# Patient Record
Sex: FEMALE | Race: WHITE | Employment: FULL TIME | URBAN - METROPOLITAN AREA
[De-identification: names, ages, dates, MRNs, and addresses within clinical notes are randomized per-mention and may not be internally consistent; named-entity substitution may affect disease eponyms.]

---

## 2017-06-26 ENCOUNTER — TELEPHONE (OUTPATIENT)
Dept: INTERNAL MEDICINE | Age: 27
End: 2017-06-26

## 2017-06-30 ENCOUNTER — OFFICE VISIT (OUTPATIENT)
Dept: INTERNAL MEDICINE | Age: 27
End: 2017-06-30
Attending: PODIATRIST

## 2017-06-30 DIAGNOSIS — M76.61 ACHILLES TENDINITIS OF BOTH LOWER EXTREMITIES: ICD-10-CM

## 2017-06-30 DIAGNOSIS — M76.62 ACHILLES TENDINITIS OF BOTH LOWER EXTREMITIES: ICD-10-CM

## 2018-07-19 ENCOUNTER — HOSPITAL ENCOUNTER (EMERGENCY)
Age: 28
Discharge: HOME | End: 2018-07-19
Attending: EMERGENCY MEDICINE
Payer: COMMERCIAL

## 2018-07-19 ENCOUNTER — APPOINTMENT (OUTPATIENT)
Dept: GENERAL RADIOLOGY | Age: 28
End: 2018-07-19
Payer: COMMERCIAL

## 2018-07-19 VITALS
TEMPERATURE: 98.1 F | HEART RATE: 81 BPM | RESPIRATION RATE: 18 BRPM | DIASTOLIC BLOOD PRESSURE: 64 MMHG | SYSTOLIC BLOOD PRESSURE: 100 MMHG | OXYGEN SATURATION: 99 %

## 2018-07-19 DIAGNOSIS — M79.672 LEFT FOOT PAIN: Primary | ICD-10-CM

## 2018-07-19 PROCEDURE — 73630 X-RAY EXAM OF FOOT: CPT

## 2018-07-19 PROCEDURE — 99283 EMERGENCY DEPT VISIT LOW MDM: CPT

## 2018-07-19 PROCEDURE — 6370000000 HC RX 637 (ALT 250 FOR IP): Performed by: EMERGENCY MEDICINE

## 2018-07-19 RX ORDER — NAPROXEN 500 MG/1
500 TABLET ORAL 2 TIMES DAILY WITH MEALS
Qty: 30 TABLET | Refills: 0 | Status: SHIPPED | OUTPATIENT
Start: 2018-07-19 | End: 2018-10-26 | Stop reason: ALTCHOICE

## 2018-07-19 RX ORDER — IBUPROFEN 400 MG/1
800 TABLET ORAL ONCE
Status: COMPLETED | OUTPATIENT
Start: 2018-07-19 | End: 2018-07-19

## 2018-07-19 RX ORDER — ACETAMINOPHEN 500 MG
1000 TABLET ORAL ONCE
Status: COMPLETED | OUTPATIENT
Start: 2018-07-19 | End: 2018-07-19

## 2018-07-19 RX ADMIN — IBUPROFEN 800 MG: 400 TABLET ORAL at 08:18

## 2018-07-19 RX ADMIN — ACETAMINOPHEN 1000 MG: 500 TABLET ORAL at 08:19

## 2018-07-19 ASSESSMENT — PAIN DESCRIPTION - PAIN TYPE: TYPE: ACUTE PAIN;CHRONIC PAIN

## 2018-07-19 ASSESSMENT — PAIN DESCRIPTION - LOCATION: LOCATION: FOOT

## 2018-07-19 ASSESSMENT — PAIN DESCRIPTION - ORIENTATION: ORIENTATION: LEFT

## 2018-07-19 ASSESSMENT — PAIN SCALES - GENERAL
PAINLEVEL_OUTOF10: 8
PAINLEVEL_OUTOF10: 8

## 2018-07-19 ASSESSMENT — PAIN DESCRIPTION - ONSET: ONSET: ON-GOING

## 2018-07-19 ASSESSMENT — PAIN DESCRIPTION - FREQUENCY: FREQUENCY: INTERMITTENT

## 2018-07-19 ASSESSMENT — PAIN DESCRIPTION - DESCRIPTORS: DESCRIPTORS: ACHING;SHARP

## 2018-07-19 ASSESSMENT — PAIN DESCRIPTION - PROGRESSION: CLINICAL_PROGRESSION: NOT CHANGED

## 2018-07-25 ENCOUNTER — OFFICE VISIT (OUTPATIENT)
Dept: ORTHOPEDIC SURGERY | Age: 28
End: 2018-07-25

## 2018-07-25 VITALS
BODY MASS INDEX: 34.55 KG/M2 | SYSTOLIC BLOOD PRESSURE: 121 MMHG | HEART RATE: 87 BPM | WEIGHT: 215 LBS | DIASTOLIC BLOOD PRESSURE: 77 MMHG | HEIGHT: 66 IN

## 2018-07-25 DIAGNOSIS — M72.2 PLANTAR FASCIITIS, BILATERAL: Primary | ICD-10-CM

## 2018-07-25 PROBLEM — Z79.899 CONTROLLED SUBSTANCE AGREEMENT SIGNED: Status: ACTIVE | Noted: 2017-11-27

## 2018-07-25 PROCEDURE — L3040 FT ARCH SUPRT PREMOLD LONGIT: HCPCS | Performed by: PODIATRIST

## 2018-07-25 PROCEDURE — G8427 DOCREV CUR MEDS BY ELIG CLIN: HCPCS | Performed by: PODIATRIST

## 2018-07-25 PROCEDURE — G8417 CALC BMI ABV UP PARAM F/U: HCPCS | Performed by: PODIATRIST

## 2018-07-25 PROCEDURE — L4360 PNEUMAT WALKING BOOT PRE CST: HCPCS | Performed by: PODIATRIST

## 2018-07-25 PROCEDURE — 1036F TOBACCO NON-USER: CPT | Performed by: PODIATRIST

## 2018-07-25 PROCEDURE — 99203 OFFICE O/P NEW LOW 30 MIN: CPT | Performed by: PODIATRIST

## 2018-07-25 RX ORDER — THERMOMETER, ELECTRONIC,ORAL
EACH MISCELLANEOUS
COMMUNITY
Start: 2018-05-08 | End: 2018-10-26 | Stop reason: ALTCHOICE

## 2018-07-25 RX ORDER — PREDNISONE 10 MG/1
TABLET ORAL
Qty: 26 TABLET | Refills: 0 | Status: SHIPPED | OUTPATIENT
Start: 2018-07-25 | End: 2018-10-26 | Stop reason: ALTCHOICE

## 2018-07-25 RX ORDER — FUROSEMIDE 20 MG/1
20 TABLET ORAL
COMMUNITY

## 2018-07-25 RX ORDER — DEXTROAMPHETAMINE SACCHARATE, AMPHETAMINE ASPARTATE, DEXTROAMPHETAMINE SULFATE AND AMPHETAMINE SULFATE 2.5; 2.5; 2.5; 2.5 MG/1; MG/1; MG/1; MG/1
TABLET ORAL
Refills: 0 | COMMUNITY
Start: 2018-07-19 | End: 2018-10-26 | Stop reason: ALTCHOICE

## 2018-07-25 RX ORDER — PREDNISONE 20 MG/1
40 TABLET ORAL
COMMUNITY
Start: 2018-05-08 | End: 2018-10-26 | Stop reason: ALTCHOICE

## 2018-07-25 NOTE — PROGRESS NOTES
HISTORY OF PRESENT ILLNESS: This is an initial visit for a 66-year-old female with heel pain to both feet. She's been having pain on and off for about 7 years. There is more pain to the left foot. There is no history of trauma. The pain is worsened with activity and relieved by rest.  Pain is often present in the mornings and after short periods of inactivity. Initially, after movement, the pain is sharp but seems to resolve for a short time with activity, only to return with prolonged activity. The patient denies any shooting pain to the toes or on the bottom of the foot. No treatment has been tried other than custom and over-the-counter foot orthoses and occasional anti-inflammatories. FAMILY HISTORY: Documented in chart. SOCIAL HISTORY: Documented in chart. REVIEW OF SYSTEMS: The patient denies any problems with cardiovascular, pulmonary, gastrointestinal, neurologic, urologic, genitourinary, psychiatric, dermatologic, and HEENT systems. PHYSICAL EXAMINATION:  The patient is alert and orientated x3. The area of greatest palpable tenderness is at the plantar medial and plantar central aspect of the left arch. She has mild pain at the plantar heel. There is no pain with side-to-side compression of the heel. There are no paresthesias elicited at the tibial nerve, over the tarsal tunnel. There is less pain to the same area of the other heel. Again, no pain is elicited with side-to-side compression of the heel. No paresthesias are noted with percussion over the heel or the tarsal tunnel. Sensation is otherwise grossly intact. There is no erythema, ecchymosis, or edema noted. Palpable pedal pulses are present. The remainder of the exam is unremarkable. RADIOGRAPHS: 3 nonweightbearing x-ray views of the left foot were reviewed. X-rays demonstrate no signs of stress fracture in the heel. No acute fractures or periosteal reactions are noted.       ASSESSMENT: Plantar fasciitis,

## 2018-07-26 ENCOUNTER — TELEPHONE (OUTPATIENT)
Dept: ORTHOPEDIC SURGERY | Age: 28
End: 2018-07-26

## 2018-08-16 ENCOUNTER — OFFICE VISIT (OUTPATIENT)
Dept: ORTHOPEDIC SURGERY | Age: 28
End: 2018-08-16

## 2018-08-16 VITALS — BODY MASS INDEX: 33.75 KG/M2 | WEIGHT: 210 LBS | HEIGHT: 66 IN

## 2018-08-16 DIAGNOSIS — M72.2 PLANTAR FASCIITIS, BILATERAL: Primary | ICD-10-CM

## 2018-08-16 DIAGNOSIS — B37.9 YEAST INFECTION: ICD-10-CM

## 2018-08-16 PROCEDURE — G8427 DOCREV CUR MEDS BY ELIG CLIN: HCPCS | Performed by: PODIATRIST

## 2018-08-16 PROCEDURE — 99213 OFFICE O/P EST LOW 20 MIN: CPT | Performed by: PODIATRIST

## 2018-08-16 PROCEDURE — G8417 CALC BMI ABV UP PARAM F/U: HCPCS | Performed by: PODIATRIST

## 2018-08-16 PROCEDURE — 1036F TOBACCO NON-USER: CPT | Performed by: PODIATRIST

## 2018-08-16 RX ORDER — FLUCONAZOLE 100 MG/1
150 TABLET ORAL DAILY
Qty: 1 TABLET | Refills: 0 | Status: SHIPPED | OUTPATIENT
Start: 2018-08-16 | End: 2018-08-17

## 2018-08-23 ENCOUNTER — HOSPITAL ENCOUNTER (EMERGENCY)
Age: 28
Discharge: HOME | End: 2018-08-23
Attending: EMERGENCY MEDICINE
Payer: COMMERCIAL

## 2018-08-23 VITALS
RESPIRATION RATE: 18 BRPM | TEMPERATURE: 98.3 F | SYSTOLIC BLOOD PRESSURE: 143 MMHG | DIASTOLIC BLOOD PRESSURE: 68 MMHG | HEART RATE: 90 BPM | OXYGEN SATURATION: 100 %

## 2018-08-23 DIAGNOSIS — M79.605 PAIN OF LEFT LOWER EXTREMITY: Primary | ICD-10-CM

## 2018-08-23 PROCEDURE — 99282 EMERGENCY DEPT VISIT SF MDM: CPT

## 2018-08-23 ASSESSMENT — PAIN SCALES - GENERAL: PAINLEVEL_OUTOF10: 8

## 2018-08-23 ASSESSMENT — ENCOUNTER SYMPTOMS
VOICE CHANGE: 0
DIARRHEA: 0
CONSTIPATION: 0
BACK PAIN: 0
SHORTNESS OF BREATH: 0
NAUSEA: 0
VOMITING: 0
RHINORRHEA: 0
WHEEZING: 0
COUGH: 0
ABDOMINAL PAIN: 0
PHOTOPHOBIA: 0
COLOR CHANGE: 0
TROUBLE SWALLOWING: 0

## 2018-08-23 ASSESSMENT — PAIN DESCRIPTION - PAIN TYPE: TYPE: ACUTE PAIN

## 2018-08-23 ASSESSMENT — PAIN DESCRIPTION - ORIENTATION: ORIENTATION: LEFT

## 2018-08-23 ASSESSMENT — PAIN DESCRIPTION - LOCATION: LOCATION: FOOT

## 2018-08-23 NOTE — ED PROVIDER NOTES
[address] RESIDENT NOTE       Date of evaluation: 2018    Chief Complaint     Foot Pain      History of Present Illness     Veronica Palmer is a 25 y.o. female who presents To the emergency department with left foot pain. The patient states she was diagnosed with plantar fasciitis Approximately 5 weeks ago by her podiatrist was placed in a boot. The patient states she has worn the boot as she was supposed to with improvement of plantar fasciitis symptoms, however now she has pain in the medial aspect of the left foot and an abrasion. The patient states the pain is sharp, consistent, made worse with palpation or movement, associated with swelling and does not radiate. The patient states the pain has been present for multiple weeks. Review of Systems     Review of Systems   Constitutional: Negative for activity change and appetite change. HENT: Negative for postnasal drip, rhinorrhea, trouble swallowing and voice change. Eyes: Negative for photophobia and visual disturbance. Respiratory: Negative for cough, shortness of breath and wheezing. Cardiovascular: Negative for chest pain and palpitations. Gastrointestinal: Negative for abdominal pain, constipation, diarrhea, nausea and vomiting. Genitourinary: Negative for difficulty urinating, dysuria, frequency and urgency. Musculoskeletal: Positive for arthralgias and joint swelling. Negative for back pain and gait problem. Skin: Negative for color change and wound. Neurological: Negative for syncope, weakness, light-headedness and headaches. Past Medical, Surgical, Family, and Social History     She has a past medical history of ADHD (attention deficit hyperactivity disorder); Back pain; and Plantar fasciitis of left foot. She has a past surgical history that includes  section; laparoscopy (N/A, 14); and Cholecystectomy.   Her family history includes No Known Problems in her brother, father, maternal aunt, maternal grandfather, maternal grandmother, maternal uncle, mother, paternal aunt, paternal grandfather, paternal grandmother, paternal uncle, sister, and another family member. She reports that she quit smoking about 3 years ago. She smoked 1.00 pack per day. She has never used smokeless tobacco. She reports that she drinks alcohol. She reports that she does not use drugs. Medications     Previous Medications    AMPHETAMINE-DEXTROAMPHETAMINE (ADDERALL XR) 10 MG EXTENDED RELEASE CAPSULE    Take 10 mg by mouth 3 times daily . AMPHETAMINE-DEXTROAMPHETAMINE (ADDERALL) 10 MG TABLET    TK 1 T PO TID    FERROUS SULFATE 325 (65 FE) MG TABLET    Take 325 mg by mouth daily (with breakfast)    FUROSEMIDE (LASIX) 20 MG TABLET    Take 20 mg by mouth    MULTIPLE VITAMINS-MINERALS (THERAPEUTIC MULTIVITAMIN-MINERALS) TABLET    Take 1 tablet by mouth daily    NAPROXEN (NAPROSYN) 500 MG TABLET    Take 1 tablet by mouth 2 times daily (with meals) for 30 doses    PREDNISONE (DELTASONE) 10 MG TABLET    3 po bid x 2 days, then 2 po bid x 2 days, then 1 po bid x 2 days, then 1 po qd x 2 days    PREDNISONE (DELTASONE) 20 MG TABLET    Take 40 mg by mouth    TOLNAFTATE (TINACTIN) 1 % CREAM    Apply topically       Allergies     She is allergic to pcn [penicillins]. Physical Exam     INITIAL VITALS: BP: (!) 143/68, Temp: 98.3 °F (36.8 °C), Pulse: 90, Resp: 18, SpO2: 100 %   Physical Exam   Constitutional: She is oriented to person, place, and time. She appears well-developed and well-nourished. Eyes: Pupils are equal, round, and reactive to light. Conjunctivae are normal.   Neck: No tracheal deviation present. Cardiovascular: Normal rate and regular rhythm. Pulmonary/Chest: Effort normal. No respiratory distress. She has no wheezes. She has no rales. Abdominal: She exhibits no distension. There is no tenderness. There is no rebound. Musculoskeletal: Normal range of motion.  She exhibits tenderness. She exhibits no edema or deformity. Neurological: She is alert and oriented to person, place, and time. No cranial nerve deficit. Coordination normal.   Skin: Skin is warm and dry. Vitals reviewed. Diagnostic Results     RADIOLOGY:  No orders to display       LABS:   No results found for this visit on 08/23/18. RECENT VITALS:  BP: (!) 143/68, Temp: 98.3 °F (36.8 °C), Pulse: 90, Resp: 18, SpO2: 100 %     Procedures     ED Course     Nursing Notes, Past Medical Hx, Past Surgical Hx, Social Hx, Allergies, and Family Hx were reviewed. The patient was given the following medications:  No orders of the defined types were placed in this encounter. CONSULTS:  None    MEDICAL DECISION MAKING / ASSESSMENT / PLAN     Fabi Bob is a 25 y.o. female     Patient presents emergency department for left foot pain. The patient previously been thing is a plantar fasciitis, seen a podiatrist and is currently being treated for plantar fasciitis. Patient states this pain is more medial on the foot and is associated with an abrasion. Suspect the patient is likely secondary to the patient's for which he has been wearing. The patient has no signs of infection on the abrasion including erythema, swelling, drainage, induration or warmth. Patient is no evidence of septic arthritis including swelling, warmth, pain with passive motion. Patient is [place] ankle negative, I do not believe x-ray of ankle was indicated at this time. However, if the patient were to have a fracture she is currently being treated with a boot which is superficial for fracture. Patient has no leg swelling, or pain in her lower extremity other than foot, I have little concern for DVT. Patient was given strict return precautions and advised to follow-up with her podiatrist.     This patient was also evaluated by the attending physician. All care plans were discussed and agreed upon. Clinical Impression     1.  Pain of left lower extremity

## 2018-10-26 ENCOUNTER — HOSPITAL ENCOUNTER (EMERGENCY)
Age: 28
Discharge: HOME | End: 2018-10-26
Attending: EMERGENCY MEDICINE
Payer: COMMERCIAL

## 2018-10-26 ENCOUNTER — APPOINTMENT (OUTPATIENT)
Dept: GENERAL RADIOLOGY | Age: 28
End: 2018-10-26
Payer: COMMERCIAL

## 2018-10-26 ENCOUNTER — APPOINTMENT (OUTPATIENT)
Dept: CT IMAGING | Age: 28
End: 2018-10-26
Payer: COMMERCIAL

## 2018-10-26 VITALS
DIASTOLIC BLOOD PRESSURE: 70 MMHG | BODY MASS INDEX: 34.53 KG/M2 | RESPIRATION RATE: 30 BRPM | HEART RATE: 89 BPM | HEIGHT: 67 IN | WEIGHT: 220 LBS | TEMPERATURE: 97.8 F | SYSTOLIC BLOOD PRESSURE: 123 MMHG | OXYGEN SATURATION: 97 %

## 2018-10-26 DIAGNOSIS — M94.0 COSTOCHONDRITIS: Primary | ICD-10-CM

## 2018-10-26 LAB
ANION GAP SERPL CALCULATED.3IONS-SCNC: 15 MMOL/L (ref 3–16)
BASOPHILS ABSOLUTE: 0 K/UL (ref 0–0.2)
BASOPHILS RELATIVE PERCENT: 0.3 %
BUN BLDV-MCNC: 9 MG/DL (ref 7–20)
CALCIUM SERPL-MCNC: 9.5 MG/DL (ref 8.3–10.6)
CHLORIDE BLD-SCNC: 102 MMOL/L (ref 99–110)
CO2: 21 MMOL/L (ref 21–32)
CREAT SERPL-MCNC: 0.6 MG/DL (ref 0.6–1.1)
EOSINOPHILS ABSOLUTE: 0.1 K/UL (ref 0–0.6)
EOSINOPHILS RELATIVE PERCENT: 1.3 %
GFR AFRICAN AMERICAN: >60
GFR NON-AFRICAN AMERICAN: >60
GLUCOSE BLD-MCNC: 154 MG/DL (ref 70–99)
HCT VFR BLD CALC: 40.5 % (ref 36–48)
HEMOGLOBIN: 13.3 G/DL (ref 12–16)
LYMPHOCYTES ABSOLUTE: 3.3 K/UL (ref 1–5.1)
LYMPHOCYTES RELATIVE PERCENT: 29.6 %
MCH RBC QN AUTO: 29 PG (ref 26–34)
MCHC RBC AUTO-ENTMCNC: 32.9 G/DL (ref 31–36)
MCV RBC AUTO: 88.1 FL (ref 80–100)
MONOCYTES ABSOLUTE: 0.4 K/UL (ref 0–1.3)
MONOCYTES RELATIVE PERCENT: 3.8 %
NEUTROPHILS ABSOLUTE: 7.2 K/UL (ref 1.7–7.7)
NEUTROPHILS RELATIVE PERCENT: 65 %
PDW BLD-RTO: 12.4 % (ref 12.4–15.4)
PLATELET # BLD: 305 K/UL (ref 135–450)
PMV BLD AUTO: 8.3 FL (ref 5–10.5)
POTASSIUM SERPL-SCNC: 3.6 MMOL/L (ref 3.5–5.1)
PREGNANCY, URINE: NEGATIVE
RBC # BLD: 4.6 M/UL (ref 4–5.2)
SODIUM BLD-SCNC: 138 MMOL/L (ref 136–145)
TROPONIN: <0.01 NG/ML
WBC # BLD: 11 K/UL (ref 4–11)

## 2018-10-26 PROCEDURE — 80048 BASIC METABOLIC PNL TOTAL CA: CPT

## 2018-10-26 PROCEDURE — 85025 COMPLETE CBC W/AUTO DIFF WBC: CPT

## 2018-10-26 PROCEDURE — 6360000002 HC RX W HCPCS: Performed by: NURSE PRACTITIONER

## 2018-10-26 PROCEDURE — 96374 THER/PROPH/DIAG INJ IV PUSH: CPT

## 2018-10-26 PROCEDURE — 6360000004 HC RX CONTRAST MEDICATION: Performed by: NURSE PRACTITIONER

## 2018-10-26 PROCEDURE — 93005 ELECTROCARDIOGRAM TRACING: CPT | Performed by: NURSE PRACTITIONER

## 2018-10-26 PROCEDURE — 99284 EMERGENCY DEPT VISIT MOD MDM: CPT

## 2018-10-26 PROCEDURE — 71046 X-RAY EXAM CHEST 2 VIEWS: CPT

## 2018-10-26 PROCEDURE — 84484 ASSAY OF TROPONIN QUANT: CPT

## 2018-10-26 PROCEDURE — 84703 CHORIONIC GONADOTROPIN ASSAY: CPT

## 2018-10-26 PROCEDURE — 71260 CT THORAX DX C+: CPT

## 2018-10-26 RX ORDER — DEXTROAMPHETAMINE SACCHARATE, AMPHETAMINE ASPARTATE, DEXTROAMPHETAMINE SULFATE AND AMPHETAMINE SULFATE 7.5; 7.5; 7.5; 7.5 MG/1; MG/1; MG/1; MG/1
30 TABLET ORAL DAILY
COMMUNITY
Start: 2018-10-01 | End: 2018-10-31

## 2018-10-26 RX ORDER — KETOROLAC TROMETHAMINE 30 MG/ML
15 INJECTION, SOLUTION INTRAMUSCULAR; INTRAVENOUS ONCE
Status: COMPLETED | OUTPATIENT
Start: 2018-10-26 | End: 2018-10-26

## 2018-10-26 RX ORDER — DEXTROAMPHETAMINE SACCHARATE, AMPHETAMINE ASPARTATE, DEXTROAMPHETAMINE SULFATE AND AMPHETAMINE SULFATE 2.5; 2.5; 2.5; 2.5 MG/1; MG/1; MG/1; MG/1
10 TABLET ORAL DAILY
COMMUNITY
Start: 2018-10-01 | End: 2018-10-31

## 2018-10-26 RX ORDER — IBUPROFEN 600 MG/1
600 TABLET ORAL EVERY 6 HOURS PRN
Qty: 30 TABLET | Refills: 0 | Status: SHIPPED | OUTPATIENT
Start: 2018-10-26 | End: 2018-12-19

## 2018-10-26 RX ADMIN — KETOROLAC TROMETHAMINE 15 MG: 30 INJECTION, SOLUTION INTRAMUSCULAR at 20:17

## 2018-10-26 RX ADMIN — IOPAMIDOL 80 ML: 755 INJECTION, SOLUTION INTRAVENOUS at 21:14

## 2018-10-26 ASSESSMENT — PAIN DESCRIPTION - FREQUENCY
FREQUENCY: CONTINUOUS
FREQUENCY: CONTINUOUS

## 2018-10-26 ASSESSMENT — PAIN DESCRIPTION - ORIENTATION
ORIENTATION: ANTERIOR;POSTERIOR;MID;UPPER
ORIENTATION: ANTERIOR;POSTERIOR;MID;UPPER

## 2018-10-26 ASSESSMENT — PAIN DESCRIPTION - DESCRIPTORS
DESCRIPTORS: DISCOMFORT;STABBING
DESCRIPTORS: DISCOMFORT;STABBING

## 2018-10-26 ASSESSMENT — PAIN DESCRIPTION - ONSET
ONSET: ON-GOING
ONSET: ON-GOING

## 2018-10-26 ASSESSMENT — PAIN DESCRIPTION - LOCATION
LOCATION: CHEST;BACK
LOCATION: CHEST;BACK

## 2018-10-26 ASSESSMENT — PAIN SCALES - GENERAL
PAINLEVEL_OUTOF10: 8
PAINLEVEL_OUTOF10: 6

## 2018-10-26 ASSESSMENT — PAIN DESCRIPTION - PAIN TYPE
TYPE: ACUTE PAIN
TYPE: ACUTE PAIN

## 2018-10-26 ASSESSMENT — PAIN DESCRIPTION - PROGRESSION: CLINICAL_PROGRESSION: GRADUALLY IMPROVING

## 2018-10-26 NOTE — ED PROVIDER NOTES
tablet by mouth every 6 hours as needed for Pain     Dispense:  30 tablet     Refill:  0            CONSULTS:  None    MEDICAL DECISION MAKING / ASSESSMENT / Carolina Flores is a 25 y.o. female presents emergency room with complaints of left scapular pain that started early this morning upon wakening that radiates into the left breast along the sternal region. Patient denies obvious injury. 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 Pain does increase with palpation, movement, and deep inspiration. Patient has been dyspneic all day. States difficult to get deep breath. She had not taken any medications for her symptoms. She was seen in urgent care and instructed to come to the ED for evaluation or rule out PE. Patient does smoke. Denies birth control pills. Denies recent trips. Denies calf tenderness but has noticed some right lower leg swelling that started early this morning. Patient states she is adopted and does not know her family history. EKG revealed normal sinus rhythm rate 88 bpm.  No acute findings. Chest x-ray negative per radiology. CBC, BMP, troponin negative. Chest CT negative for PE. Patient states feeling better after Toradol injection. Symptoms appear consistent with costochondritis. Written a prescription for ibuprofen. Instructed to push fluids. Call her primary doctor on Monday for follow-up appointment. Return here for worsening of symptoms or other concerns. This patient was alsoevaluated by the attending physician. Complete historyand physical was performed by me and Deepthi Mccurdy MD  . Nursing notes, past medical history, surgical history, family history and social history were reviewed and addressed in the HPI. All care plans were discussed and agreed upon. Clinical Impression     1.  Costochondritis        Disposition     PATIENT REFERRED TO:  Jesi Dumont MD  [facility]  [address] [address]  [phone]      Call Monday for an appointment      DISCHARGE MEDICATIONS:  New Prescriptions    IBUPROFEN (ADVIL;MOTRIN) 600 MG TABLET    Take 1 tablet by mouth every 6 hours as needed for Pain       DISPOSITION Decision To Discharge        LUKAS Voss - ANA  10/26/18 3808

## 2018-10-29 LAB
EKG ATRIAL RATE: 88 BPM
EKG DIAGNOSIS: NORMAL
EKG P AXIS: 50 DEGREES
EKG P-R INTERVAL: 120 MS
EKG Q-T INTERVAL: 388 MS
EKG QRS DURATION: 74 MS
EKG QTC CALCULATION (BAZETT): 469 MS
EKG R AXIS: 46 DEGREES
EKG T AXIS: 44 DEGREES
EKG VENTRICULAR RATE: 88 BPM

## 2018-12-19 ENCOUNTER — APPOINTMENT (OUTPATIENT)
Dept: GENERAL RADIOLOGY | Age: 28
End: 2018-12-19
Payer: COMMERCIAL

## 2018-12-19 ENCOUNTER — HOSPITAL ENCOUNTER (EMERGENCY)
Age: 28
Discharge: HOME | End: 2018-12-19
Attending: EMERGENCY MEDICINE
Payer: COMMERCIAL

## 2018-12-19 VITALS
DIASTOLIC BLOOD PRESSURE: 84 MMHG | BODY MASS INDEX: 33.74 KG/M2 | HEIGHT: 67 IN | OXYGEN SATURATION: 99 % | WEIGHT: 215 LBS | TEMPERATURE: 98.4 F | RESPIRATION RATE: 20 BRPM | SYSTOLIC BLOOD PRESSURE: 137 MMHG | HEART RATE: 97 BPM

## 2018-12-19 DIAGNOSIS — M79.671 RIGHT FOOT PAIN: Primary | ICD-10-CM

## 2018-12-19 PROCEDURE — 6370000000 HC RX 637 (ALT 250 FOR IP): Performed by: EMERGENCY MEDICINE

## 2018-12-19 PROCEDURE — 73630 X-RAY EXAM OF FOOT: CPT

## 2018-12-19 PROCEDURE — 99283 EMERGENCY DEPT VISIT LOW MDM: CPT

## 2018-12-19 RX ORDER — IBUPROFEN 600 MG/1
600 TABLET ORAL EVERY 6 HOURS PRN
Qty: 30 TABLET | Refills: 0 | Status: SHIPPED | OUTPATIENT
Start: 2018-12-19

## 2018-12-19 RX ORDER — DEXTROAMPHETAMINE SACCHARATE, AMPHETAMINE ASPARTATE, DEXTROAMPHETAMINE SULFATE AND AMPHETAMINE SULFATE 7.5; 7.5; 7.5; 7.5 MG/1; MG/1; MG/1; MG/1
30 TABLET ORAL DAILY
COMMUNITY

## 2018-12-19 RX ORDER — BUPROPION HYDROCHLORIDE 100 MG/1
250 TABLET ORAL 2 TIMES DAILY
COMMUNITY

## 2018-12-19 RX ORDER — DEXTROAMPHETAMINE SACCHARATE, AMPHETAMINE ASPARTATE, DEXTROAMPHETAMINE SULFATE AND AMPHETAMINE SULFATE 2.5; 2.5; 2.5; 2.5 MG/1; MG/1; MG/1; MG/1
10 TABLET ORAL DAILY
COMMUNITY

## 2018-12-19 RX ADMIN — IBUPROFEN 600 MG: 400 TABLET ORAL at 21:03

## 2018-12-19 ASSESSMENT — PAIN DESCRIPTION - LOCATION: LOCATION: TOE (COMMENT WHICH ONE)

## 2018-12-19 ASSESSMENT — PAIN SCALES - GENERAL
PAINLEVEL_OUTOF10: 4
PAINLEVEL_OUTOF10: 4

## 2018-12-19 ASSESSMENT — PAIN DESCRIPTION - PAIN TYPE: TYPE: ACUTE PAIN

## 2018-12-20 NOTE — ED PROVIDER NOTES
swelling or redness or foreign bodies. Sensation intact to light touch. No calcaneal tenderness. Full strength throughout foot. Neurological: She is alert and oriented to person, place, and time. No cranial nerve deficit. She exhibits normal muscle tone. Skin: Skin is warm and dry. Capillary refill takes less than 2 seconds. No erythema. No pallor. Psychiatric: She has a normal mood and affect. Her behavior is normal.   Nursing note and vitals reviewed. Diagnostic Results     EKG   none    RADIOLOGY:  XR FOOT RIGHT (MIN 3 VIEWS)   Final Result      No acute abnormality. LABS:   Labs Reviewed - No data to display    RECENT VITALS:  BP: 137/84,Temp: 98.4 °F (36.9 °C), Pulse: 97, Resp: 20     Procedures         ED Course     The patient was given the following medications:  Orders Placed This Encounter   Medications    ibuprofen (ADVIL;MOTRIN) tablet 600 mg    ibuprofen (ADVIL;MOTRIN) 600 MG tablet     Sig: Take 1 tablet by mouth every 6 hours as needed for Pain     Dispense:  30 tablet     Refill:  0       CONSULTS:  None    MEDICAL DECISION MAKING     Lio Pretty is a 25 y.o. female presented with left foot pain after jumping out of a truck. Concern for possible metatarsal fracture versus ligamentous injury. X-ray obtained and shows No evidence of fracture. Neurologically intact throughout. Musculoskeletal possible ligamentous injury. Advised to be broken for pain. Discharged in good condition. Postop shoe given for comfort. This patient was also evaluated by the attending physician. All care plans were discussed and agreed upon. Clinical Impression     1.  Right foot pain        Disposition/Plan     PATIENT REFERRED TO:  Eric Hirsch MD  [address]  [phone]    Schedule an appointment as soon as possible for a visit in 1 week  For further evaluation, As needed      DISCHARGE MEDICATIONS:  New Prescriptions    IBUPROFEN

## 2019-02-10 ENCOUNTER — APPOINTMENT (OUTPATIENT)
Dept: GENERAL RADIOLOGY | Age: 29
End: 2019-02-10

## 2019-02-10 ENCOUNTER — HOSPITAL ENCOUNTER (EMERGENCY)
Age: 29
Discharge: HOME | End: 2019-02-10
Attending: EMERGENCY MEDICINE

## 2019-02-10 VITALS
RESPIRATION RATE: 12 BRPM | OXYGEN SATURATION: 100 % | TEMPERATURE: 98 F | DIASTOLIC BLOOD PRESSURE: 61 MMHG | SYSTOLIC BLOOD PRESSURE: 144 MMHG | HEART RATE: 69 BPM

## 2019-02-10 DIAGNOSIS — M25.561 ACUTE PAIN OF RIGHT KNEE: Primary | ICD-10-CM

## 2019-02-10 DIAGNOSIS — S89.91XA SOFT TISSUE INJURY OF RIGHT KNEE, INITIAL ENCOUNTER: ICD-10-CM

## 2019-02-10 PROCEDURE — 73562 X-RAY EXAM OF KNEE 3: CPT

## 2019-02-10 PROCEDURE — 6370000000 HC RX 637 (ALT 250 FOR IP): Performed by: STUDENT IN AN ORGANIZED HEALTH CARE EDUCATION/TRAINING PROGRAM

## 2019-02-10 PROCEDURE — 73590 X-RAY EXAM OF LOWER LEG: CPT

## 2019-02-10 PROCEDURE — 99283 EMERGENCY DEPT VISIT LOW MDM: CPT

## 2019-02-10 RX ORDER — IBUPROFEN 400 MG/1
800 TABLET ORAL ONCE
Status: COMPLETED | OUTPATIENT
Start: 2019-02-10 | End: 2019-02-10

## 2019-02-10 RX ADMIN — IBUPROFEN 800 MG: 400 TABLET, FILM COATED ORAL at 10:22

## 2019-02-10 ASSESSMENT — PAIN DESCRIPTION - PAIN TYPE: TYPE: ACUTE PAIN

## 2019-02-10 ASSESSMENT — PAIN SCALES - GENERAL
PAINLEVEL_OUTOF10: 5
PAINLEVEL_OUTOF10: 5

## 2019-02-10 ASSESSMENT — ENCOUNTER SYMPTOMS
GASTROINTESTINAL NEGATIVE: 1
RESPIRATORY NEGATIVE: 1
BACK PAIN: 0

## 2019-02-10 ASSESSMENT — PAIN DESCRIPTION - FREQUENCY: FREQUENCY: CONTINUOUS

## 2019-02-10 ASSESSMENT — PAIN DESCRIPTION - DESCRIPTORS: DESCRIPTORS: CONSTANT;DULL

## 2019-02-10 ASSESSMENT — PAIN DESCRIPTION - ORIENTATION: ORIENTATION: RIGHT

## 2019-02-10 ASSESSMENT — PAIN DESCRIPTION - LOCATION: LOCATION: KNEE

## 2019-04-04 ENCOUNTER — HOSPITAL ENCOUNTER (EMERGENCY)
Age: 29
Discharge: HOME | End: 2019-04-04
Attending: EMERGENCY MEDICINE

## 2019-04-04 VITALS
DIASTOLIC BLOOD PRESSURE: 95 MMHG | BODY MASS INDEX: 32.33 KG/M2 | HEIGHT: 67 IN | OXYGEN SATURATION: 97 % | RESPIRATION RATE: 17 BRPM | WEIGHT: 206 LBS | SYSTOLIC BLOOD PRESSURE: 150 MMHG | TEMPERATURE: 97.6 F | HEART RATE: 78 BPM

## 2019-04-04 DIAGNOSIS — K02.9 DENTAL CARIES: Primary | ICD-10-CM

## 2019-04-04 DIAGNOSIS — K08.89 ODONTALGIA: ICD-10-CM

## 2019-04-04 PROCEDURE — 6370000000 HC RX 637 (ALT 250 FOR IP): Performed by: EMERGENCY MEDICINE

## 2019-04-04 PROCEDURE — 99282 EMERGENCY DEPT VISIT SF MDM: CPT

## 2019-04-04 RX ORDER — OXYCODONE HYDROCHLORIDE AND ACETAMINOPHEN 5; 325 MG/1; MG/1
1-2 TABLET ORAL EVERY 8 HOURS PRN
Qty: 6 TABLET | Refills: 0 | Status: SHIPPED | OUTPATIENT
Start: 2019-04-04 | End: 2019-04-11

## 2019-04-04 RX ORDER — NAPROXEN 500 MG/1
500 TABLET ORAL 2 TIMES DAILY
Qty: 20 TABLET | Refills: 0 | Status: SHIPPED | OUTPATIENT
Start: 2019-04-04 | End: 2019-04-14

## 2019-04-04 RX ORDER — CLINDAMYCIN HYDROCHLORIDE 300 MG/1
300 CAPSULE ORAL 2 TIMES DAILY
Qty: 14 CAPSULE | Refills: 0 | Status: SHIPPED | OUTPATIENT
Start: 2019-04-04 | End: 2019-04-11

## 2019-04-04 RX ORDER — CLINDAMYCIN HYDROCHLORIDE 300 MG/1
300 CAPSULE ORAL EVERY 6 HOURS SCHEDULED
Status: DISCONTINUED | OUTPATIENT
Start: 2019-04-04 | End: 2019-04-04 | Stop reason: HOSPADM

## 2019-04-04 RX ORDER — NAPROXEN 500 MG/1
500 TABLET ORAL ONCE
Status: DISCONTINUED | OUTPATIENT
Start: 2019-04-04 | End: 2019-04-04

## 2019-04-04 RX ORDER — OXYCODONE HYDROCHLORIDE AND ACETAMINOPHEN 5; 325 MG/1; MG/1
1 TABLET ORAL ONCE
Status: COMPLETED | OUTPATIENT
Start: 2019-04-04 | End: 2019-04-04

## 2019-04-04 RX ADMIN — OXYCODONE AND ACETAMINOPHEN 1 TABLET: 5; 325 TABLET ORAL at 04:44

## 2019-04-04 RX ADMIN — CLINDAMYCIN HYDROCHLORIDE 300 MG: 300 CAPSULE ORAL at 04:44

## 2019-04-04 ASSESSMENT — PAIN DESCRIPTION - PAIN TYPE: TYPE: ACUTE PAIN

## 2019-04-04 ASSESSMENT — PAIN DESCRIPTION - LOCATION: LOCATION: MOUTH

## 2019-04-04 ASSESSMENT — PAIN DESCRIPTION - FREQUENCY: FREQUENCY: INTERMITTENT

## 2019-04-04 ASSESSMENT — PAIN DESCRIPTION - DESCRIPTORS: DESCRIPTORS: SHARP;CONSTANT

## 2019-04-04 ASSESSMENT — PAIN DESCRIPTION - ORIENTATION: ORIENTATION: LEFT;UPPER;LOWER

## 2019-04-04 ASSESSMENT — PAIN SCALES - GENERAL
PAINLEVEL_OUTOF10: 10

## 2019-04-04 ASSESSMENT — PAIN DESCRIPTION - PROGRESSION: CLINICAL_PROGRESSION: GRADUALLY WORSENING

## 2019-04-04 NOTE — ED PROVIDER NOTES
[address] [facility]  eMERGENCY dEPARTMENT eNCOUnter      Pt Name: Shayna Dodd  MRN: [id number]  [place] 1990  Date of evaluation: 2019  Provider: Makeda Hunt MD  PCP: Amna Nunn MD      [address] [place] [address]       Chief Complaint   Patient presents with    Dental Pain     pt states off and on she is woken up out of sleep with severe pain on the left side of her mouth. HISTORY OFPRESENT ILLNESS   (Location/Symptom, Timing/Onset, Context/Setting, Quality, Duration, Modifying Factors,Severity)  Note limiting factors. Shayna Dodd is a 25 y.o. female  Presents with complaints of severe tooth pain  She states that she is woken up out  Several times tonight she has several teeth that she needs work done but doesn't have any insurance she rates her pain at a 10 out of 10 she is not a diabetic she is afebrile. Is at the overall problem for several weeks. The pain got worse tonight    Nursing Notes were all reviewed and agreed with or any disagreements were addressed  in the HPI. REVIEW OF SYSTEMS    (2-9 systems for level 4, 10 or more for level 5)     Review of Systems    Positives and Pertinent negatives as per HPI. Except as noted above in the ROS, all other systems were reviewed andnegative. PASTMEDICAL HISTORY     Past Medical History:   Diagnosis Date    ADHD (attention deficit hyperactivity disorder)     Back pain     ALEJANDRO (iron deficiency anemia)     Obesity     Plantar fasciitis of left foot          SURGICAL HISTORY       Past Surgical History:   Procedure Laterality Date     SECTION      CHOLECYSTECTOMY      LAPAROSCOPY N/A 14    APAROSCOPIC CHOLECYSTECTOMY WITH INTRAOPERATIVE         CURRENT MEDICATIONS       Previous Medications    AMPHETAMINE-DEXTROAMPHETAMINE (ADDERALL) 10 MG TABLET    Take 10 mg by mouth daily. .    AMPHETAMINE-DEXTROAMPHETAMINE (ADDERALL) 30 MG TABLET    Take 30 mg by mouth daily. .    BUPROPION labs were within normal range or not returned as of this dictation. EKG: All EKG's are interpreted by the Emergency Department Physician who eithersigns or Co-signs this chart in the absence of a cardiologist.        RADIOLOGY:   Non-plain film images such as CT, Ultrasound and MRI are read by the radiologist. Plain radiographic images are visualized by myself. *    Interpretation per the Radiologist below, if available at the time of this note:    No orders to display         PROCEDURES   Unless otherwise noted below, none     Procedures    *    CRITICAL CARE TIME   N/A      EMERGENCY DEPARTMENT COURSE and DIFFERENTIALDIAGNOSIS/MDM:   Vitals:    Vitals:    04/04/19 0434   BP: (!) 150/95   Pulse: 78   Resp: 17   Temp: 97.6 °F (36.4 °C)   TempSrc: Oral   SpO2: 97%   Weight: 93.4 kg (206 lb)   Height: 5' 7\" (1.702 m)       Patient was given thefollowing medications:  Medications   clindamycin (CLEOCIN) capsule 300 mg (300 mg Oral Given 4/4/19 0444)   oxyCODONE-acetaminophen (PERCOCET) 5-325 MG per tablet 1 tablet (1 tablet Oral Given 4/4/19 0444)           The patient tolerated their visit well. The patient and / or the familywere informed of the results of any tests, a time was given to answer questions. FINAL IMPRESSION      1. Dental caries    2. Odontalgia          DISPOSITION/PLAN   DISPOSITION Decision To Discharge 04/04/2019 04:39:17 AM      PATIENT REFERRED TO:  Dental clinic    In 2 days        DISCHARGE MEDICATIONS:  New Prescriptions    CLINDAMYCIN (CLEOCIN) 300 MG CAPSULE    Take 1 capsule by mouth 2 times daily for 7 days    NAPROXEN (NAPROSYN) 500 MG TABLET    Take 1 tablet by mouth 2 times daily for 20 doses    OXYCODONE-ACETAMINOPHEN (PERCOCET) 5-325 MG PER TABLET    Take 1-2 tablets by mouth every 8 hours as needed for Pain for up to 7 days.        DISCONTINUED MEDICATIONS:  Discontinued Medications    No medications on file              (Please note that portions of this note were completed with a voice recognition program.  Efforts were made to edit the dictations but occasionally words are mis-transcribed.)    Trang Mccarty MD (electronically signed)      Trang Mccarty MD  04/04/19 3051 Lacie Glass MD  04/04/19 0815

## 2019-05-25 ENCOUNTER — HOSPITAL ENCOUNTER (EMERGENCY)
Age: 29
Discharge: HOME | End: 2019-05-25
Attending: EMERGENCY MEDICINE

## 2019-05-25 VITALS
SYSTOLIC BLOOD PRESSURE: 127 MMHG | OXYGEN SATURATION: 98 % | WEIGHT: 199.31 LBS | TEMPERATURE: 98.1 F | DIASTOLIC BLOOD PRESSURE: 74 MMHG | RESPIRATION RATE: 16 BRPM | HEART RATE: 72 BPM | BODY MASS INDEX: 31.22 KG/M2

## 2019-05-25 DIAGNOSIS — N30.01 ACUTE CYSTITIS WITH HEMATURIA: Primary | ICD-10-CM

## 2019-05-25 LAB
BACTERIA: ABNORMAL /HPF
BILIRUBIN URINE: NEGATIVE
BLOOD, URINE: ABNORMAL
CLARITY: ABNORMAL
COLOR: ABNORMAL
COMMENT UA: ABNORMAL
GLUCOSE URINE: NEGATIVE MG/DL
HCG(URINE) PREGNANCY TEST: NEGATIVE
KETONES, URINE: NEGATIVE MG/DL
LEUKOCYTE ESTERASE, URINE: ABNORMAL
MICROSCOPIC EXAMINATION: YES
NITRITE, URINE: POSITIVE
PH UA: 6 (ref 5–8)
PROTEIN UA: 100 MG/DL
RBC UA: >100 /HPF (ref 0–2)
SPECIFIC GRAVITY UA: >=1.03 (ref 1–1.03)
URINE REFLEX TO CULTURE: YES
URINE TYPE: ABNORMAL
UROBILINOGEN, URINE: 0.2 E.U./DL
WBC UA: ABNORMAL /HPF (ref 0–5)

## 2019-05-25 PROCEDURE — 87077 CULTURE AEROBIC IDENTIFY: CPT

## 2019-05-25 PROCEDURE — 99283 EMERGENCY DEPT VISIT LOW MDM: CPT

## 2019-05-25 PROCEDURE — 81001 URINALYSIS AUTO W/SCOPE: CPT

## 2019-05-25 PROCEDURE — 87086 URINE CULTURE/COLONY COUNT: CPT

## 2019-05-25 PROCEDURE — 84703 CHORIONIC GONADOTROPIN ASSAY: CPT

## 2019-05-25 PROCEDURE — 87186 SC STD MICRODIL/AGAR DIL: CPT

## 2019-05-25 PROCEDURE — 6370000000 HC RX 637 (ALT 250 FOR IP): Performed by: EMERGENCY MEDICINE

## 2019-05-25 RX ORDER — ONDANSETRON 4 MG/1
4 TABLET, ORALLY DISINTEGRATING ORAL ONCE
Status: COMPLETED | OUTPATIENT
Start: 2019-05-25 | End: 2019-05-25

## 2019-05-25 RX ORDER — PHENAZOPYRIDINE HYDROCHLORIDE 200 MG/1
200 TABLET, FILM COATED ORAL 3 TIMES DAILY PRN
Qty: 12 TABLET | Refills: 0 | Status: SHIPPED | OUTPATIENT
Start: 2019-05-25 | End: 2019-05-28

## 2019-05-25 RX ORDER — PHENAZOPYRIDINE HYDROCHLORIDE 100 MG/1
200 TABLET, FILM COATED ORAL
Status: DISCONTINUED | OUTPATIENT
Start: 2019-05-25 | End: 2019-05-25

## 2019-05-25 RX ORDER — FLUCONAZOLE 150 MG/1
150 TABLET ORAL ONCE
Qty: 1 TABLET | Refills: 0 | Status: SHIPPED | OUTPATIENT
Start: 2019-05-25 | End: 2019-05-25

## 2019-05-25 RX ORDER — SULFAMETHOXAZOLE AND TRIMETHOPRIM 800; 160 MG/1; MG/1
1 TABLET ORAL 2 TIMES DAILY
Qty: 14 TABLET | Refills: 0 | Status: SHIPPED | OUTPATIENT
Start: 2019-05-25 | End: 2019-06-01

## 2019-05-25 RX ORDER — PHENAZOPYRIDINE HYDROCHLORIDE 100 MG/1
200 TABLET, FILM COATED ORAL
Status: DISCONTINUED | OUTPATIENT
Start: 2019-05-25 | End: 2019-05-25 | Stop reason: HOSPADM

## 2019-05-25 RX ADMIN — ONDANSETRON 4 MG: 4 TABLET, ORALLY DISINTEGRATING ORAL at 11:50

## 2019-05-25 RX ADMIN — PHENAZOPYRIDINE HYDROCHLORIDE 200 MG: 100 TABLET ORAL at 11:19

## 2019-05-25 ASSESSMENT — ENCOUNTER SYMPTOMS
CONSTIPATION: 0
EYES NEGATIVE: 1
RESPIRATORY NEGATIVE: 1
DIARRHEA: 0
COUGH: 0
ABDOMINAL PAIN: 0
SHORTNESS OF BREATH: 0
NAUSEA: 0

## 2019-05-25 ASSESSMENT — PAIN DESCRIPTION - PAIN TYPE: TYPE: ACUTE PAIN

## 2019-05-25 ASSESSMENT — PAIN DESCRIPTION - LOCATION: LOCATION: OTHER (COMMENT)

## 2019-05-25 ASSESSMENT — PAIN SCALES - GENERAL: PAINLEVEL_OUTOF10: 5

## 2019-05-25 NOTE — ED PROVIDER NOTES
Date ofevaluation: 2019    Chief Complaint     Hematuria      History of Present Illness     Baron Johnson is a 25 y.o. female who presents with increased frequency urgency and cramping with urination. Patient awoke this morning with abrupt onset of discomfort when urinating. Shethen she had some blood in her urine at the same time. She is not currently on her menstrual cycle that was approximately 10 days ago. She denies any possibility of pregnancy now. She denies any fever or chills or associated flank pain. She has no nausea or vomiting. Review of Systems     Review of Systems   Constitutional: Negative for activity change, appetite change, chills and fever. HENT: Negative. Eyes: Negative. Respiratory: Negative. Negative for cough and shortness of breath. Cardiovascular: Negative for chest pain and palpitations. Gastrointestinal: Negative for abdominal pain, constipation, diarrhea and nausea. Genitourinary: Positive for decreased urine volume, frequency, hematuria and urgency. Negative for dysuria, flank pain, vaginal bleeding and vaginal discharge. Musculoskeletal: Negative. Skin: Negative. Neurological: Negative. Hematological: Negative for adenopathy. Psychiatric/Behavioral: Negative. All other systems reviewed and are negative. Past Medical, Surgical, Family, and SocialHistory     She has a past medical history of ADHD (attention deficit hyperactivity disorder), Back pain, ALEJANDRO (iron deficiency anemia), Obesity, and Plantar fasciitis of left foot. She has a past surgical history that includes  section; laparoscopy (N/A, 14); and Cholecystectomy. Her family history includes No Known Problems in her brother, father, maternal aunt, maternal grandfather, maternal grandmother, maternal uncle, mother, paternal aunt, paternal grandfather, paternal grandmother, paternal uncle, sister, and another family member.   She reports that she has been smoking cigarettes. She has been smoking about 0.50 packs per day. She has never used smokeless tobacco. She reports that she drinks about 1.2 oz of alcohol per week. She reports that she does not use drugs. Medications     Previous Medications    AMPHETAMINE-DEXTROAMPHETAMINE (ADDERALL) 10 MG TABLET    Take 10 mg by mouth daily. .    AMPHETAMINE-DEXTROAMPHETAMINE (ADDERALL) 30 MG TABLET    Take 30 mg by mouth daily. .    BUPROPION (WELLBUTRIN) 100 MG TABLET    Take 250 mg by mouth 2 times daily    FERROUS SULFATE 325 (65 FE) MG TABLET    Take 650 mg by mouth daily (with breakfast)     FUROSEMIDE (LASIX) 20 MG TABLET    Take 20 mg by mouth    IBUPROFEN (ADVIL;MOTRIN) 600 MG TABLET    Take 1 tablet by mouth every 6 hours as needed for Pain    MULTIPLE VITAMINS-MINERALS (THERAPEUTIC MULTIVITAMIN-MINERALS) TABLET    Take 1 tablet by mouth daily    NAPROXEN (NAPROSYN) 500 MG TABLET    Take 1 tablet by mouth 2 times daily for 20 doses       Allergies     She is allergic to pcn [penicillins]. Physical Exam     INITIAL VITALS: BP: 127/74, Temp: 98.1 °F (36.7 °C), Pulse: 72, Resp: 16, SpO2: 98 %   Physical Exam   Constitutional: She is oriented to person, place, and time. She appears well-developed and well-nourished. No distress. HENT:   Head: Normocephalic. Eyes: Pupils are equal, round, and reactive to light. EOM are normal.   Neck: Neck supple. Cardiovascular: Normal rate, regular rhythm, normal heart sounds and intact distal pulses. Pulmonary/Chest: Breath sounds normal. She has no rales. Abdominal:   No pain to percuss the flanks bilaterally. Musculoskeletal: She exhibits no edema. Lymphadenopathy:     She has no cervical adenopathy. Neurological: She is alert and oriented to person, place, and time. Skin: Skin is warm and dry. She is not diaphoretic. Psychiatric: She has a normal mood and affect. Nursing note and vitals reviewed.       Diagnostic Results     EKG       RADIOLOGY:  No orders to

## 2019-05-25 NOTE — ED NOTES
Patient still in ER, asking for medication for yeast infection to take after she takes ax. Reviewed with Dr. Jose J Mcfadden. See orders.      Susana High RN  05/25/19 9016

## 2019-05-27 LAB
ORGANISM: ABNORMAL
URINE CULTURE, ROUTINE: ABNORMAL
URINE CULTURE, ROUTINE: ABNORMAL

## 2019-06-10 ENCOUNTER — HOSPITAL ENCOUNTER (EMERGENCY)
Age: 29
Discharge: HOME | End: 2019-06-10
Attending: EMERGENCY MEDICINE

## 2019-06-10 VITALS
TEMPERATURE: 97.9 F | OXYGEN SATURATION: 99 % | WEIGHT: 199 LBS | RESPIRATION RATE: 18 BRPM | HEART RATE: 64 BPM | BODY MASS INDEX: 31.17 KG/M2 | DIASTOLIC BLOOD PRESSURE: 67 MMHG | SYSTOLIC BLOOD PRESSURE: 109 MMHG

## 2019-06-10 DIAGNOSIS — M54.5 ACUTE RIGHT-SIDED LOW BACK PAIN, WITH SCIATICA PRESENCE UNSPECIFIED: Primary | ICD-10-CM

## 2019-06-10 LAB
ANION GAP SERPL CALCULATED.3IONS-SCNC: 10 MMOL/L (ref 3–16)
BACTERIA: ABNORMAL /HPF
BASOPHILS ABSOLUTE: 0.1 K/UL (ref 0–0.2)
BASOPHILS RELATIVE PERCENT: 0.6 %
BILIRUBIN URINE: NEGATIVE
BLOOD, URINE: NEGATIVE
BUN BLDV-MCNC: 10 MG/DL (ref 7–20)
CALCIUM SERPL-MCNC: 9 MG/DL (ref 8.3–10.6)
CHLORIDE BLD-SCNC: 105 MMOL/L (ref 99–110)
CLARITY: CLEAR
CO2: 27 MMOL/L (ref 21–32)
COLOR: YELLOW
CREAT SERPL-MCNC: 0.6 MG/DL (ref 0.6–1.1)
EOSINOPHILS ABSOLUTE: 0.2 K/UL (ref 0–0.6)
EOSINOPHILS RELATIVE PERCENT: 2 %
EPITHELIAL CELLS, UA: ABNORMAL /HPF
GFR AFRICAN AMERICAN: >60
GFR NON-AFRICAN AMERICAN: >60
GLUCOSE BLD-MCNC: 89 MG/DL (ref 70–99)
GLUCOSE URINE: NEGATIVE MG/DL
HCT VFR BLD CALC: 36.5 % (ref 36–48)
HEMOGLOBIN: 12.1 G/DL (ref 12–16)
KETONES, URINE: NEGATIVE MG/DL
LEUKOCYTE ESTERASE, URINE: NEGATIVE
LYMPHOCYTES ABSOLUTE: 3.1 K/UL (ref 1–5.1)
LYMPHOCYTES RELATIVE PERCENT: 32.2 %
MCH RBC QN AUTO: 29.7 PG (ref 26–34)
MCHC RBC AUTO-ENTMCNC: 33.3 G/DL (ref 31–36)
MCV RBC AUTO: 89.3 FL (ref 80–100)
MICROSCOPIC EXAMINATION: YES
MONOCYTES ABSOLUTE: 0.7 K/UL (ref 0–1.3)
MONOCYTES RELATIVE PERCENT: 7.4 %
MUCUS: ABNORMAL /LPF
NEUTROPHILS ABSOLUTE: 5.6 K/UL (ref 1.7–7.7)
NEUTROPHILS RELATIVE PERCENT: 57.8 %
NITRITE, URINE: NEGATIVE
PDW BLD-RTO: 12.6 % (ref 12.4–15.4)
PH UA: 5.5 (ref 5–8)
PLATELET # BLD: 325 K/UL (ref 135–450)
PMV BLD AUTO: 7.9 FL (ref 5–10.5)
POTASSIUM REFLEX MAGNESIUM: 4 MMOL/L (ref 3.5–5.1)
PROTEIN UA: ABNORMAL MG/DL
RBC # BLD: 4.09 M/UL (ref 4–5.2)
RBC UA: ABNORMAL /HPF (ref 0–2)
SODIUM BLD-SCNC: 142 MMOL/L (ref 136–145)
SPECIFIC GRAVITY UA: >=1.03 (ref 1–1.03)
URINE TYPE: ABNORMAL
UROBILINOGEN, URINE: 0.2 E.U./DL
WBC # BLD: 9.7 K/UL (ref 4–11)
WBC UA: ABNORMAL /HPF (ref 0–5)

## 2019-06-10 PROCEDURE — 99284 EMERGENCY DEPT VISIT MOD MDM: CPT

## 2019-06-10 PROCEDURE — 80048 BASIC METABOLIC PNL TOTAL CA: CPT

## 2019-06-10 PROCEDURE — 6360000002 HC RX W HCPCS: Performed by: EMERGENCY MEDICINE

## 2019-06-10 PROCEDURE — 85025 COMPLETE CBC W/AUTO DIFF WBC: CPT

## 2019-06-10 PROCEDURE — 96374 THER/PROPH/DIAG INJ IV PUSH: CPT

## 2019-06-10 PROCEDURE — 81001 URINALYSIS AUTO W/SCOPE: CPT

## 2019-06-10 RX ORDER — CYCLOBENZAPRINE HCL 5 MG
5 TABLET ORAL 3 TIMES DAILY PRN
Qty: 15 TABLET | Refills: 0 | Status: SHIPPED | OUTPATIENT
Start: 2019-06-10 | End: 2019-06-15

## 2019-06-10 RX ORDER — KETOROLAC TROMETHAMINE 30 MG/ML
15 INJECTION, SOLUTION INTRAMUSCULAR; INTRAVENOUS ONCE
Status: COMPLETED | OUTPATIENT
Start: 2019-06-10 | End: 2019-06-10

## 2019-06-10 RX ADMIN — KETOROLAC TROMETHAMINE 15 MG: 30 INJECTION, SOLUTION INTRAMUSCULAR at 23:07

## 2019-06-10 ASSESSMENT — PAIN DESCRIPTION - LOCATION: LOCATION: FLANK

## 2019-06-10 ASSESSMENT — PAIN SCALES - GENERAL
PAINLEVEL_OUTOF10: 7
PAINLEVEL_OUTOF10: 4
PAINLEVEL_OUTOF10: 4
PAINLEVEL_OUTOF10: 7

## 2019-06-10 ASSESSMENT — PAIN DESCRIPTION - DESCRIPTORS: DESCRIPTORS: ACHING

## 2019-06-10 ASSESSMENT — PAIN DESCRIPTION - PAIN TYPE: TYPE: ACUTE PAIN

## 2019-06-10 ASSESSMENT — PAIN DESCRIPTION - ORIENTATION: ORIENTATION: RIGHT

## 2019-06-11 NOTE — ED PROVIDER NOTES
[address] [facility] RESIDENT NOTE       Date of evaluation: 6/10/2019    Chief Complaint     Flank Pain (right flank pain)      History of Present Illness     Baldo Alfred is a 25 y.o. female who presents to ED for evaluation of right-sided flank pain x2 days. Patient reports 2 weeks ago she had a severe urinary tract infection with \"chunks of my bladder coming out my urine\". She reports her symptoms have completely resolved with antibiotics. She noted that yesterday she started having some bilateral flank pain more greater on the right. Today she woke up and the pain was significantly worse. Was unable to get comfortable and feels like it is radiating around into her groin and down her leg. She is also complaining of severe cramps with this current menstrual cycle with 4 episodes of soft stool today. Endorses intermittent nausea but no vomiting. Endorses intentional 20 pound weight loss over the last 4 months. Reports he is also had greater than 10 yeast infections in the last year-has been tested for diabetes by her primary care provider and was negative. Denies history of kidney stones. Review of Systems     Review of Systems   Constitutional: Negative for activity change, appetite change, fatigue and fever. HENT: Negative for congestion, ear pain, nosebleeds and sore throat. Eyes: Negative for pain, discharge and itching. Respiratory: Negative for cough and shortness of breath. Cardiovascular: Negative for chest pain. Gastrointestinal:+ abdominal pain, diarrhea, nausea    Genitourinary: Negative for dysuria, and hematuria. +flank pain   Musculoskeletal: Negative for arthralgias, back pain, myalgias and neck pain. Skin: Negative for pallor and rash. Neurological: Negative for dizziness, tremors, weakness and headaches. All other systems reviewed and are negative.       Past Medical, Surgical, Family, and Social History     She has a light. Conjunctivae and EOM are normal.   Neck: Normal range of motion. Neck supple. Cardiovascular: Normal rate, regular rhythm and normal heart sounds. Pulmonary/Chest: Effort normal and breath sounds normal. No respiratory distress. She has no wheezes. She has no rales. She exhibits no tenderness. Abdominal: Soft. Bowel sounds are normal. There is no tenderness. There is no rebound and no guarding. Musculoskeletal: Normal range of motion. Right hip: Normal.        Left hip: Normal.        Cervical back: Normal.        Thoracic back: Normal.        Lumbar back: She exhibits tenderness and pain. She exhibits no bony tenderness, no swelling, no laceration and no spasm. Back:    Neurological: She is alert and oriented to person, place, and time. No cranial nerve deficit. Coordination normal.   Normal gait   Skin: Skin is warm and dry. Capillary refill takes less than 2 seconds. Psychiatric: She has a normal mood and affect. Nursing note and vitals reviewed.       DiagnosticResults     RADIOLOGY:  No orders to display       LABS:   Results for orders placed or performed during the hospital encounter of 06/10/19   Urinalysis, reflex to microscopic   Result Value Ref Range    Color, UA Yellow Straw/Yellow    Clarity, UA Clear Clear    Glucose, Ur Negative Negative mg/dL    Bilirubin Urine Negative Negative    Ketones, Urine Negative Negative mg/dL    Specific Gravity, UA >=1.030 1.005 - 1.030    Blood, Urine Negative Negative    pH, UA 5.5 5.0 - 8.0    Protein, UA TRACE (A) Negative mg/dL    Urobilinogen, Urine 0.2 <2.0 E.U./dL    Nitrite, Urine Negative Negative    Leukocyte Esterase, Urine Negative Negative    Microscopic Examination YES     Urine Type Not Specified    Basic Metabolic Panel w/ Reflex to MG   Result Value Ref Range    Sodium 142 136 - 145 mmol/L    Potassium reflex Magnesium 4.0 3.5 - 5.1 mmol/L    Chloride 105 99 - 110 mmol/L    CO2 27 21 - 32 mmol/L    Anion Gap 10 3 - 16 Glucose 89 70 - 99 mg/dL    BUN 10 7 - 20 mg/dL    CREATININE 0.6 0.6 - 1.1 mg/dL    GFR Non-African American >60 >60    GFR African American >60 >60    Calcium 9.0 8.3 - 10.6 mg/dL   CBC Auto Differential   Result Value Ref Range    WBC 9.7 4.0 - 11.0 K/uL    RBC 4.09 4.00 - 5.20 M/uL    Hemoglobin 12.1 12.0 - 16.0 g/dL    Hematocrit 36.5 36.0 - 48.0 %    MCV 89.3 80.0 - 100.0 fL    MCH 29.7 26.0 - 34.0 pg    MCHC 33.3 31.0 - 36.0 g/dL    RDW 12.6 12.4 - 15.4 %    Platelets 887 431 - 575 K/uL    MPV 7.9 5.0 - 10.5 fL    Neutrophils % 57.8 %    Lymphocytes % 32.2 %    Monocytes % 7.4 %    Eosinophils % 2.0 %    Basophils % 0.6 %    Neutrophils # 5.6 1.7 - 7.7 K/uL    Lymphocytes # 3.1 1.0 - 5.1 K/uL    Monocytes # 0.7 0.0 - 1.3 K/uL    Eosinophils # 0.2 0.0 - 0.6 K/uL    Basophils # 0.1 0.0 - 0.2 K/uL   Microscopic Urinalysis   Result Value Ref Range    Mucus, UA Rare (A) /LPF    WBC, UA None seen 0 - 5 /HPF    RBC, UA 0-2 0 - 2 /HPF    Epi Cells 5-10 /HPF    Bacteria, UA Rare (A) /HPF         RECENT VITALS:  BP: 123/73, Temp: 97.9 °F (36.6 °C), Pulse: 79,Resp: 16, SpO2: 100 %     Procedures     None    ED Course     Nursing Notes, Past Medical Hx, Past Surgical Hx, Social Hx, Allergies, and Family Hx were reviewed. The patient was given the followingmedications:  Orders Placed This Encounter   Medications    ketorolac (TORADOL) injection 15 mg    cyclobenzaprine (FLEXERIL) 5 MG tablet     Sig: Take 1 tablet by mouth 3 times daily as needed for Muscle spasms     Dispense:  15 tablet     Refill:  0       CONSULTS:  None    MEDICAL DECISION MAKING / ASSESSMENT / Mandi Boast is a 25 y.o. female who presents to the emergency room for evaluation of right-sided low back pain radiating into her hip. On presentation she is he medically stable. On exam she has reproducible tenderness over the right para spinous lumbar muscles without palpable spasm and no midline tenderness.   She has full range of motion of

## 2019-06-11 NOTE — ED PROVIDER NOTES
ED Attending Attestation Note     Date of evaluation: 6/10/2019    This patient was seen by the resident. I have seen and examined the patient, agree with the workup, evaluation, management and diagnosis. The care plan has been discussed. My assessment reveals A&O, well-appearing, mild reproducible right low back tenderness without midline involvement, non-tender abdomen, neuro intact, clear lungs.       Maria Dolores Reina MD  06/10/19 5788

## 2019-06-13 ENCOUNTER — HOSPITAL ENCOUNTER (EMERGENCY)
Age: 29
Discharge: HOME | End: 2019-06-13
Attending: EMERGENCY MEDICINE

## 2019-06-13 ENCOUNTER — APPOINTMENT (OUTPATIENT)
Dept: GENERAL RADIOLOGY | Age: 29
End: 2019-06-13

## 2019-06-13 VITALS
HEART RATE: 58 BPM | BODY MASS INDEX: 31.39 KG/M2 | DIASTOLIC BLOOD PRESSURE: 68 MMHG | SYSTOLIC BLOOD PRESSURE: 110 MMHG | RESPIRATION RATE: 18 BRPM | OXYGEN SATURATION: 99 % | TEMPERATURE: 98.4 F | WEIGHT: 200 LBS | HEIGHT: 67 IN

## 2019-06-13 DIAGNOSIS — S39.012A STRAIN OF LUMBAR REGION, INITIAL ENCOUNTER: Primary | ICD-10-CM

## 2019-06-13 LAB
BILIRUBIN URINE: NEGATIVE
BLOOD, URINE: NEGATIVE
CLARITY: CLEAR
COLOR: YELLOW
GLUCOSE URINE: NEGATIVE MG/DL
HCG(URINE) PREGNANCY TEST: NEGATIVE
KETONES, URINE: NEGATIVE MG/DL
LEUKOCYTE ESTERASE, URINE: NEGATIVE
MICROSCOPIC EXAMINATION: NORMAL
NITRITE, URINE: NEGATIVE
PH UA: 6.5 (ref 5–8)
PROTEIN UA: NEGATIVE MG/DL
SPECIFIC GRAVITY UA: 1.01 (ref 1–1.03)
URINE TYPE: NORMAL
UROBILINOGEN, URINE: 0.2 E.U./DL

## 2019-06-13 PROCEDURE — 6370000000 HC RX 637 (ALT 250 FOR IP): Performed by: NURSE PRACTITIONER

## 2019-06-13 PROCEDURE — 72100 X-RAY EXAM L-S SPINE 2/3 VWS: CPT

## 2019-06-13 PROCEDURE — 99283 EMERGENCY DEPT VISIT LOW MDM: CPT

## 2019-06-13 PROCEDURE — 81003 URINALYSIS AUTO W/O SCOPE: CPT

## 2019-06-13 PROCEDURE — 6360000002 HC RX W HCPCS: Performed by: NURSE PRACTITIONER

## 2019-06-13 PROCEDURE — 96372 THER/PROPH/DIAG INJ SC/IM: CPT

## 2019-06-13 PROCEDURE — 84703 CHORIONIC GONADOTROPIN ASSAY: CPT

## 2019-06-13 RX ORDER — MORPHINE SULFATE 10 MG/ML
8 INJECTION, SOLUTION INTRAMUSCULAR; INTRAVENOUS ONCE
Status: COMPLETED | OUTPATIENT
Start: 2019-06-13 | End: 2019-06-13

## 2019-06-13 RX ORDER — DIAZEPAM 5 MG/1
5 TABLET ORAL ONCE
Status: COMPLETED | OUTPATIENT
Start: 2019-06-13 | End: 2019-06-13

## 2019-06-13 RX ORDER — KETOROLAC TROMETHAMINE 30 MG/ML
15 INJECTION, SOLUTION INTRAMUSCULAR; INTRAVENOUS ONCE
Status: COMPLETED | OUTPATIENT
Start: 2019-06-13 | End: 2019-06-13

## 2019-06-13 RX ADMIN — DIAZEPAM 5 MG: 5 TABLET ORAL at 11:16

## 2019-06-13 RX ADMIN — KETOROLAC TROMETHAMINE 15 MG: 30 INJECTION, SOLUTION INTRAMUSCULAR at 15:09

## 2019-06-13 RX ADMIN — MORPHINE SULFATE 8 MG: 10 INJECTION INTRAVENOUS at 11:16

## 2019-06-13 RX ADMIN — MORPHINE SULFATE 8 MG: 10 INJECTION INTRAVENOUS at 12:47

## 2019-06-13 RX ADMIN — HYDROMORPHONE HYDROCHLORIDE 1 MG: 1 INJECTION, SOLUTION INTRAMUSCULAR; INTRAVENOUS; SUBCUTANEOUS at 15:10

## 2019-06-13 ASSESSMENT — PAIN SCALES - GENERAL
PAINLEVEL_OUTOF10: 10

## 2019-06-13 ASSESSMENT — PAIN DESCRIPTION - LOCATION: LOCATION: BACK

## 2019-06-13 NOTE — ED NOTES
Pt discharged to home. Pt verbalized understanding of discharged instructions.  Pt left ED ambulatory without incident and being picked up by friend in lobby Sheryle Fetters, [place]  06/13/19 9744

## 2019-06-13 NOTE — ED NOTES
Pt stands and pivots to use bedside commode. Urine obtained and sent to lab. Pt voids about 200 ml straw colored urine. Pt medicated for pain.  Call light in reach     Chintan Flores RN  06/13/19 1300

## 2019-06-13 NOTE — ED TRIAGE NOTES
Pt. States she was in Ed for back pain and was prescribed flexeril. She went home and states that her pain was better yesterday. Then this am she woke up with sever back pain.

## 2019-06-13 NOTE — ED PROVIDER NOTES
eMERGENCY dEPARTMENT eNCOUnter        [address] [facility]    Chief Complaint   Patient presents with    Back Pain     was seen on Monday for back pain, gave her medication, took muscle relaxer, pain was gone yesterday, woke up this am with pain, she went back to work yesterday-[facility] [employer]    Monika Morris is a 25 y.o. female who presents to emergency room with complaints of low back pain. Patient was seen in emergency room on Monday for the same chief complaint. States she works in [address]. 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 Patient denies obvious injury at that time. As the day progressed, her pain progressively got worse and she was seen in the emergency room. Patient was worked up to rule out UTI since she had a previous UTI several weeks prior. Her urinalysis, CBC, BMP were unremarkable. She was written a prescription for Flexeril and discharged. States she had awakened today with increased pain to her lower back. Denied obvious injury. Denies radiation of pain or numbness and tingling to the extremities. Patient states she is having difficulty bearing weight secondary to pain. Denies IV drug use. Denies fever. REVIEW OF SYSTEMS    See [facility] for further details. Review of systems otherwise negative.      PAST MEDICAL HISTORY    Past Medical History:   Diagnosis Date    ADHD (attention deficit hyperactivity disorder)     Back pain     ALEJANDRO (iron deficiency anemia)     Obesity     Plantar fasciitis of left foot        SURGICAL HISTORY    Past Surgical History:   Procedure Laterality Date     SECTION      CHOLECYSTECTOMY      LAPAROSCOPY N/A 14    APAROSCOPIC CHOLECYSTECTOMY WITH INTRAOPERATIVE       CURRENT MEDICATIONS    Current Outpatient Rx   Medication Sig Dispense Refill    cyclobenzaprine (FLEXERIL) 5 MG tablet Take 1 tablet by mouth 3 times daily as needed for Muscle spasms 15 tablet 0    naproxen (NAPROSYN) 500 MG tablet Take 1 tablet by mouth 2 times daily for 20 doses 20 tablet 0    amphetamine-dextroamphetamine (ADDERALL) 30 MG tablet Take 30 mg by mouth daily. Elieser Grant amphetamine-dextroamphetamine (ADDERALL) 10 MG tablet Take 10 mg by mouth daily. Elieser Grant buPROPion (WELLBUTRIN) 100 MG tablet Take 250 mg by mouth 2 times daily      ibuprofen (ADVIL;MOTRIN) 600 MG tablet Take 1 tablet by mouth every 6 hours as needed for Pain 30 tablet 0    furosemide (LASIX) 20 MG tablet Take 20 mg by mouth      Multiple Vitamins-Minerals (THERAPEUTIC MULTIVITAMIN-MINERALS) tablet Take 1 tablet by mouth daily      ferrous sulfate 325 (65 FE) MG tablet Take 650 mg by mouth daily (with breakfast)          ALLERGIES    Allergies   Allergen Reactions    Pcn [Penicillins] Other (See Comments) and Hives       FAMILY HISTORY    Family History   Problem Relation Age of Onset    No Known Problems Mother     No Known Problems Father     No Known Problems Sister     No Known Problems Brother     No Known Problems Maternal Aunt     No Known Problems Maternal Uncle     No Known Problems Paternal Aunt     No Known Problems Paternal Uncle     No Known Problems Maternal Grandmother     No Known Problems Maternal Grandfather     No Known Problems Paternal Grandmother     No Known Problems Paternal Grandfather     No Known Problems Other     Anesth Problems Neg Hx     Broken Bones Neg Hx     Cancer Neg Hx     Clotting Disorder Neg Hx     Collagen Disease Neg Hx     Diabetes Neg Hx     Dislocations Neg Hx     Osteoporosis Neg Hx     Rheumatologic Disease Neg Hx     Scoliosis Neg Hx     Severe Sprains Neg Hx        SOCIAL HISTORY    Social History     Socioeconomic History    Marital status: Single     Spouse name: Not on file    Number of children: Not on file    Years of education: Not on file    Highest education level: Not on file   Occupational History    Not on file   Social Needs    Financial resource strain: Not on file    Food insecurity: Worry: Not on file     Inability: Not on file    Transportation needs:     Medical: Not on file     Non-medical: Not on file   Tobacco Use    Smoking status: Current Every Day Smoker     Packs/day: 0.50     Types: Cigarettes    Smokeless tobacco: Never Used   Substance and Sexual Activity    Alcohol use: Yes     Alcohol/week: 1.2 oz     Types: 2 Cans of beer per week     Comment: occasional    Drug use: No     Comment: quit smoking for awhile but restarted     Sexual activity: Yes     Partners: Male   Lifestyle    Physical activity:     Days per week: Not on file     Minutes per session: Not on file    Stress: Not on file   Relationships    Social connections:     Talks on phone: Not on file     Gets together: Not on file     Attends Sikhism service: Not on file     Active member of club or organization: Not on file     Attends meetings of clubs or organizations: Not on file     Relationship status: Not on file    Intimate partner violence:     Fear of current or ex partner: Not on file     Emotionally abused: Not on file     Physically abused: Not on file     Forced sexual activity: Not on file   Other Topics Concern    Not on file   Social History Narrative    Not on file       PHYSICAL EXAM    VITAL SIGNS: /64   Pulse 55   Temp 98.4 °F (36.9 °C) (Oral)   Resp 18   Ht 5' 7\" (1.702 m)   Wt 200 lb (90.7 kg)   LMP 06/06/2019 Comment: approx 2 weeks ago LMP  SpO2 99%   BMI 31.32 kg/m²    Constitutional:  Well developed, well nourished, no acute distress, non-toxic appearance HENT:  Atraumatic, external ears normal, nose normal, oropharynx moist. Neck- normal range of motion, no tenderness, supple   Respiratory:  No respiratory distress, normal breath sounds.    Cardiovascular:  Normal rate, normal rhythm, no murmurs, no gallops, no rubs   GI:  Soft, nondistended, nontender, no organomegaly, no mass, no rebound, no guarding   :  No costovertebral angle tenderness   Musculoskeletal:  No edema, no tenderness, no deformities. Back- diffuse lumbar tenderness and bilateral paravertebral tenderness on exam.  No soft tissue swelling or bruising noted. No saddle paresthesia. Sensation intact lower extremity's. Straight leg raise negative. Dorsiflexion and plantar flexion intact. Peripheral pulses intact. Integument:  Well hydrated   Neurologic:  Awake and oriented ×3. Moves all 4 extremities without difficulty. EKG    None    RADIOLOGY/PROCEDURES    XR LUMBAR SPINE (2-3 VIEWS)   Final Result      3 views demonstrate 5 lumbar type vertebral bodies in anatomic alignment without fracture. Mild degenerative endplate changes at Z5-6, L3-4, and L4-5. Labs Reviewed   URINALYSIS    Narrative:     Performed at: [facility]  [address], [facility] [address]   Phone [phone]   PREGNANCY, URINE    Narrative:     Performed at: [facility]  [address], [facility] [address]   Phone [phone]         ED COURSE & MEDICAL DECISION MAKING    Pertinent Labs & Imaging studies reviewed. (See chart for details)    Patient presents emergency room complaints of lower back pain since Monday. States has progressively gotten worse. Seen emergency room on Monday and had negative workup. She was written a perception for Flexeril which she has been taken with no relief. Had awakened today with increased low back pain. Denies radiation of pain or numbness and tingling to the lower extremities. Patient has no evidence of cauda equina. Patient was given morphine 8 mg IM and Valium 5 mg tablet. Lumbar x-ray negative per radiology. Patient was given additional morphine 8 mg IM for continued pain. She initially was getting some relief. States pain started coming back after attempting to move the patient. Patient then was given Dilaudid 1 mg IM and Toradol 15 mg IM.   At 1600, patient was able to

## 2019-06-15 ENCOUNTER — HOSPITAL ENCOUNTER (EMERGENCY)
Age: 29
Discharge: HOME | End: 2019-06-15
Attending: EMERGENCY MEDICINE

## 2019-06-15 VITALS
HEART RATE: 86 BPM | BODY MASS INDEX: 31.39 KG/M2 | TEMPERATURE: 98.9 F | HEIGHT: 67 IN | SYSTOLIC BLOOD PRESSURE: 131 MMHG | RESPIRATION RATE: 16 BRPM | OXYGEN SATURATION: 98 % | WEIGHT: 200 LBS | DIASTOLIC BLOOD PRESSURE: 85 MMHG

## 2019-06-15 DIAGNOSIS — F39 MOOD DISORDER (HCC): Primary | ICD-10-CM

## 2019-06-15 DIAGNOSIS — F32.A DEPRESSION, UNSPECIFIED DEPRESSION TYPE: ICD-10-CM

## 2019-06-15 DIAGNOSIS — R73.9 HYPERGLYCEMIA: ICD-10-CM

## 2019-06-15 LAB
A/G RATIO: 1.5 (ref 1.1–2.2)
ACETAMINOPHEN LEVEL: <5 UG/ML (ref 10–30)
ALBUMIN SERPL-MCNC: 4.8 G/DL (ref 3.4–5)
ALP BLD-CCNC: 95 U/L (ref 40–129)
ALT SERPL-CCNC: 26 U/L (ref 10–40)
AMPHETAMINE SCREEN, URINE: ABNORMAL
ANION GAP SERPL CALCULATED.3IONS-SCNC: 14 MMOL/L (ref 3–16)
AST SERPL-CCNC: 20 U/L (ref 15–37)
BACTERIA: ABNORMAL /HPF
BARBITURATE SCREEN URINE: ABNORMAL
BASOPHILS ABSOLUTE: 0 K/UL (ref 0–0.2)
BASOPHILS RELATIVE PERCENT: 0.2 %
BENZODIAZEPINE SCREEN, URINE: ABNORMAL
BILIRUB SERPL-MCNC: <0.2 MG/DL (ref 0–1)
BILIRUBIN URINE: NEGATIVE
BLOOD, URINE: ABNORMAL
BUN BLDV-MCNC: 12 MG/DL (ref 7–20)
CALCIUM SERPL-MCNC: 9.2 MG/DL (ref 8.3–10.6)
CANNABINOID SCREEN URINE: POSITIVE
CHLORIDE BLD-SCNC: 100 MMOL/L (ref 99–110)
CLARITY: CLEAR
CO2: 21 MMOL/L (ref 21–32)
COCAINE METABOLITE SCREEN URINE: ABNORMAL
COLOR: YELLOW
CREAT SERPL-MCNC: 0.7 MG/DL (ref 0.6–1.1)
CRYSTALS, UA: ABNORMAL /HPF
EOSINOPHILS ABSOLUTE: 0 K/UL (ref 0–0.6)
EOSINOPHILS RELATIVE PERCENT: 0 %
EPITHELIAL CELLS, UA: ABNORMAL /HPF
ETHANOL: 126 MG/DL (ref 0–0.08)
GFR AFRICAN AMERICAN: >60
GFR NON-AFRICAN AMERICAN: >60
GLOBULIN: 3.3 G/DL
GLUCOSE BLD-MCNC: 205 MG/DL (ref 70–99)
GLUCOSE URINE: 500 MG/DL
HCG(URINE) PREGNANCY TEST: NEGATIVE
HCT VFR BLD CALC: 41.7 % (ref 36–48)
HEMOGLOBIN: 14.2 G/DL (ref 12–16)
KETONES, URINE: NEGATIVE MG/DL
LEUKOCYTE ESTERASE, URINE: NEGATIVE
LYMPHOCYTES ABSOLUTE: 0.9 K/UL (ref 1–5.1)
LYMPHOCYTES RELATIVE PERCENT: 12.6 %
Lab: ABNORMAL
MCH RBC QN AUTO: 30.6 PG (ref 26–34)
MCHC RBC AUTO-ENTMCNC: 34 G/DL (ref 31–36)
MCV RBC AUTO: 90.1 FL (ref 80–100)
METHADONE SCREEN, URINE: ABNORMAL
MICROSCOPIC EXAMINATION: YES
MONOCYTES ABSOLUTE: 0 K/UL (ref 0–1.3)
MONOCYTES RELATIVE PERCENT: 0.4 %
MUCUS: ABNORMAL /LPF
NEUTROPHILS ABSOLUTE: 6 K/UL (ref 1.7–7.7)
NEUTROPHILS RELATIVE PERCENT: 86.8 %
NITRITE, URINE: NEGATIVE
OPIATE SCREEN URINE: POSITIVE
OXYCODONE URINE: ABNORMAL
PDW BLD-RTO: 13 % (ref 12.4–15.4)
PH UA: 5.5
PH UA: 5.5 (ref 5–8)
PHENCYCLIDINE SCREEN URINE: ABNORMAL
PLATELET # BLD: 375 K/UL (ref 135–450)
PMV BLD AUTO: 8 FL (ref 5–10.5)
POTASSIUM SERPL-SCNC: 3.8 MMOL/L (ref 3.5–5.1)
PROPOXYPHENE SCREEN: ABNORMAL
PROTEIN UA: NEGATIVE MG/DL
RBC # BLD: 4.63 M/UL (ref 4–5.2)
RBC UA: ABNORMAL /HPF (ref 0–2)
SALICYLATE, SERUM: 0.5 MG/DL (ref 15–30)
SODIUM BLD-SCNC: 135 MMOL/L (ref 136–145)
SPECIFIC GRAVITY UA: 1.02 (ref 1–1.03)
TOTAL PROTEIN: 8.1 G/DL (ref 6.4–8.2)
URINE TYPE: ABNORMAL
UROBILINOGEN, URINE: 0.2 E.U./DL
WBC # BLD: 6.9 K/UL (ref 4–11)
WBC UA: ABNORMAL /HPF (ref 0–5)

## 2019-06-15 PROCEDURE — G0480 DRUG TEST DEF 1-7 CLASSES: HCPCS

## 2019-06-15 PROCEDURE — 80053 COMPREHEN METABOLIC PANEL: CPT

## 2019-06-15 PROCEDURE — 80307 DRUG TEST PRSMV CHEM ANLYZR: CPT

## 2019-06-15 PROCEDURE — 6370000000 HC RX 637 (ALT 250 FOR IP): Performed by: EMERGENCY MEDICINE

## 2019-06-15 PROCEDURE — 81001 URINALYSIS AUTO W/SCOPE: CPT

## 2019-06-15 PROCEDURE — 99285 EMERGENCY DEPT VISIT HI MDM: CPT

## 2019-06-15 PROCEDURE — 84703 CHORIONIC GONADOTROPIN ASSAY: CPT

## 2019-06-15 PROCEDURE — 85025 COMPLETE CBC W/AUTO DIFF WBC: CPT

## 2019-06-15 RX ORDER — LIDOCAINE 4 G/G
1 PATCH TOPICAL ONCE
Status: DISCONTINUED | OUTPATIENT
Start: 2019-06-15 | End: 2019-06-15 | Stop reason: HOSPADM

## 2019-06-15 ASSESSMENT — PAIN SCALES - GENERAL: PAINLEVEL_OUTOF10: 4

## 2019-06-15 ASSESSMENT — PAIN DESCRIPTION - LOCATION: LOCATION: BACK

## 2019-06-15 NOTE — ED TRIAGE NOTES
Yesterday patient tried to commit suicide by taking a bottle of ambien however couldn't find them and found robaxin. Patient boyfriend intervened and stopped her from taking them. Patient states, she's not crazy and doesn't understand why she's having these thoughts. She has two children to live for. Patient states she has multiple issues including back pain that is not being treated.

## 2019-06-15 NOTE — ED NOTES
Patient has order to discharge home. Went over discharge instructions with patient. Patient states she understands. Patient left to home with family.       Jc Lopez RN  06/15/19 1445

## 2019-06-15 NOTE — ED NOTES
Call placed to patient boyfriend Paulette Tatum. Message left awaiting call back.       Rodrigue Martinez RN  06/15/19 6637
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
## 2019-06-15 NOTE — ED NOTES
Presenting Problem:  Attempted overdose on Robaxin due to severe back pain, denies being suicide attempt    Appearance/Hygiene:  hospital attire, good grooming and fair hygiene   Motor Behavior: wnl   Attitude: cooperative  Affect: normal affect   Speech: normal pitch and normal volume  Mood: sad   Thought Processes: [place] and Logical  Perceptions: Absent   Thought content: wnl   Suicidal ideation:  Denies current thoughts, reports she was overwhelmed in pain and impulsively attempted to swallow a handful of Robaxin denies being suicidal  Homicidal ideation:  none  Orientation: A&Ox4   Memory: intact  Concentration: Good    Insight/ judgement: impaired judgement normal insight      Psychosocial and contextual factors: pain    C-SSRS Summary (including current and past suicidal ideation, plan, intent, and attempts) : denies any current suicidal thoughts, denies past attempts, reports was not a suicide attempt was impulsive due to pain and just wanted to stop the pain    Psychiatric History: yes    Patient reported diagnosis : ADHD    Outpatient services/ Provider:none    Previous Inpatient Admissions( including location and dates if known): none    Self-injurious/ Self-harm behavior: none    History of violence: none    Current Substance use: none    Trauma identified: none    Access to Firearms: denies    ASSESSMENT FOR IMMINENT FUTURE DANGER:      RISK FACTORS:    []  Age <25 or >49   []  Male gender   []  Depressed mood   []  Active suicidal ideation   []  Suicide plan   []  Suicide attempt   []  Access to lethal means   []  Prior suicide attempt   []  Active substance abuse   [x]  Highly impulsive behaviors   []  Not attending to self-care/ADLs    []  Recent significant loss   []  Chronic pain or medical illness   []  Social isolation   []  History of violence   []  Active psychosis   []  Cognitive impairment    [x]  No outpatient services in place   []  Medication noncompliance   []  No collateral information to support safety   []      PROTECTIVE FACTORS:  [x] Age >25 and <55  [x] Female gender   [] Denies depression  [x] Denies suicidal ideation  [x] Does not have lethal plan   [x] Does not have access to guns or weapons  [x] Patient is verbally nafisa for safety  [x] No prior suicide attempts  [x] No active substance abuse  [x] Patient has social or family support  [x] No active psychosis or cognitive dysfunction  [x] Physically healthy  [] Has outpatient services in place  [] Compliant with recommended medications  [] Collateral information from  supports patient safety   [x] Patient is future oriented with plans to spend day with boyfrienkathleen Ortega and  her kids on Sunday. [x] Adequate sleep and appetite       Clinical Summary:    Patient presents to [facility] voluntarily accompanied by friend. Patient alert and oriented, cooperative and pleasant. Appears sad and tearful at times about being here. Reports she was having severe back pain and grabbed a handful of muscle relaxers and was going to swallow them. Reports her boyfriend stopped her. Reports she does not want to die. Denies any current thoughts of suicide. Reports she would never kill herself. Reports she was in severe pain and just wanted the pain to go away. Patient had impulsive actions. Reports she started having back pain this past week and has been seen in ER for pain. Denies current pain. Reports she was referred to Neurosurgeon however currently has no insurance. Reports no other major stressors. Reports she has a supportive boyfriend Landon Ortega and supportive friends and family. Reports she has 2 kids she wants to live for. Reports she currently lives with her 2 children and works as a . Reports her boyfriend could stay with her for a while. Patient future oriented talking about spending the weekend with her boyfriend and picking her children up from their dad's on Sunday. Patient denies any past admissions in [place] [id number] unit.  Reports she has seen a Psychiatrist in past for ADHD however see's no one currently except PCP. Reports she was on Wellbutrin for Anxiety and to stop smoking. Reports she has not been able to get her prescription. Reports she will get assistance from her boyfriend to refill it. Patient was clinically sober at the time of the evaluation. Patient was evaluated and offered supportive counseling. Collateral information was gathered by SW from patient friend. Patient gave this nurse permission to call her boyfriend. Call placed and message left. Will await for call back. Will then place call to Psychiatrist on call.         Gurwinder Castro RN  06/15/19 3857

## 2019-06-15 NOTE — ED PROVIDER NOTES
Medical History:   Diagnosis Date    ADHD (attention deficit hyperactivity disorder)     Back pain     ALEJANDRO (iron deficiency anemia)     Obesity     Plantar fasciitis of left foot      Past Surgical History:   Procedure Laterality Date     SECTION      CHOLECYSTECTOMY      LAPAROSCOPY N/A 14    APAROSCOPIC CHOLECYSTECTOMY WITH INTRAOPERATIVE     Family History   Problem Relation Age of Onset    No Known Problems Mother     No Known Problems Father     No Known Problems Sister     No Known Problems Brother     No Known Problems Maternal Aunt     No Known Problems Maternal Uncle     No Known Problems Paternal Aunt     No Known Problems Paternal Uncle     No Known Problems Maternal Grandmother     No Known Problems Maternal Grandfather     No Known Problems Paternal Grandmother     No Known Problems Paternal Grandfather     No Known Problems Other     Anesth Problems Neg Hx     Broken Bones Neg Hx     Cancer Neg Hx     Clotting Disorder Neg Hx     Collagen Disease Neg Hx     Diabetes Neg Hx     Dislocations Neg Hx     Osteoporosis Neg Hx     Rheumatologic Disease Neg Hx     Scoliosis Neg Hx     Severe Sprains Neg Hx      Social History     Socioeconomic History    Marital status: Single     Spouse name: Not on file    Number of children: Not on file    Years of education: Not on file    Highest education level: Not on file   Occupational History    Not on file   Social Needs    Financial resource strain: Not on file    Food insecurity:     Worry: Not on file     Inability: Not on file    Transportation needs:     Medical: Not on file     Non-medical: Not on file   Tobacco Use    Smoking status: Current Every Day Smoker     Packs/day: 0.50     Types: Cigarettes    Smokeless tobacco: Never Used   Substance and Sexual Activity    Alcohol use:  Yes     Alcohol/week: 1.2 oz     Types: 2 Cans of beer per week     Comment: occasional    Drug use: No     Comment: quit smoking for awhile but restarted     Sexual activity: Yes     Partners: Male   Lifestyle    Physical activity:     Days per week: Not on file     Minutes per session: Not on file    Stress: Not on file   Relationships    Social connections:     Talks on phone: Not on file     Gets together: Not on file     Attends Gnosticist service: Not on file     Active member of club or organization: Not on file     Attends meetings of clubs or organizations: Not on file     Relationship status: Not on file    Intimate partner violence:     Fear of current or ex partner: Not on file     Emotionally abused: Not on file     Physically abused: Not on file     Forced sexual activity: Not on file   Other Topics Concern    Not on file   Social History Narrative    Not on file     Current Facility-Administered Medications   Medication Dose Route Frequency Provider Last Rate Last Dose    lidocaine 4 % external patch 1 patch  1 patch Transdermal Once Debra Hebert MD   1 patch at 06/15/19 0902     Current Outpatient Medications   Medication Sig Dispense Refill    amphetamine-dextroamphetamine (ADDERALL) 30 MG tablet Take 30 mg by mouth daily. David Chance amphetamine-dextroamphetamine (ADDERALL) 10 MG tablet Take 10 mg by mouth daily. Selden Chance cyclobenzaprine (FLEXERIL) 5 MG tablet Take 1 tablet by mouth 3 times daily as needed for Muscle spasms 15 tablet 0    naproxen (NAPROSYN) 500 MG tablet Take 1 tablet by mouth 2 times daily for 20 doses 20 tablet 0    buPROPion (WELLBUTRIN) 100 MG tablet Take 250 mg by mouth 2 times daily      ibuprofen (ADVIL;MOTRIN) 600 MG tablet Take 1 tablet by mouth every 6 hours as needed for Pain 30 tablet 0    furosemide (LASIX) 20 MG tablet Take 20 mg by mouth      Multiple Vitamins-Minerals (THERAPEUTIC MULTIVITAMIN-MINERALS) tablet Take 1 tablet by mouth daily      ferrous sulfate 325 (65 FE) MG tablet Take 650 mg by mouth daily (with breakfast)        Allergies   Allergen Reactions    Pcn [Penicillins] Other (See Comments) and Hives       Nursing Notes Reviewed    Physical Exam:  Triage VS:    ED Triage Vitals [06/15/19 0656]   Enc Vitals Group      /85      Pulse 86      Resp 16      Temp 98.9 °F (37.2 °C)      Temp Source Oral      SpO2 98 %      Weight 200 lb (90.7 kg)      Height 5' 7\" (1.702 m)      Head Circumference       Peak Flow       Pain Score       Pain Loc       Pain Edu? Excl. in [address]? My pulse ox interpretation is - normal    General appearance:  No acute distress. Tearful  Head is normocephalic atraumatic  Skin:  Warm. Dry. Eye:  Extraocular movements intact. Ears, nose, mouth and throat:  Oral mucosa moist throat is clear speech is clear  Neck:  Trachea midline. Supple full range of motion no JVD  Extremity:  No swelling. Normal ROM ambulatory  Heart:  Regular rate and rhythm, normal S1 & S2,   Perfusion:  intact  Respiratory:  Lungs clear to auscultation bilaterally. Respirations nonlabored. Abdominal:  Normal bowel sounds. Soft. Nontender. Non distended. Back:  No CVA tenderness to palpation full range of motion  Neurological:  Alert and oriented times 3. No focal neuro deficits.              Psychiatric: Depressed, tearful    I have reviewed and interpreted all of the currently available lab results from this visit (if applicable):  Results for orders placed or performed during the hospital encounter of 06/15/19   Pregnancy, Urine   Result Value Ref Range    HCG(Urine) Pregnancy Test Negative Detects HCG level >20 MIU/mL   Urinalysis   Result Value Ref Range    Color, UA Yellow Straw/Yellow    Clarity, UA Clear Clear    Glucose, Ur 500 (A) Negative mg/dL    Bilirubin Urine Negative Negative    Ketones, Urine Negative Negative mg/dL    Specific Gravity, UA 1.020 1.005 - 1.030    Blood, Urine TRACE-LYSED (A) Negative    pH, UA 5.5 5.0 - 8.0    Protein, UA Negative Negative mg/dL    Urobilinogen, Urine 0.2 <2.0 E.U./dL    Nitrite, Urine Negative 1.0 mg/dL    Alkaline Phosphatase 95 40 - 129 U/L    ALT 26 10 - 40 U/L    AST 20 15 - 37 U/L    Globulin 3.3 g/dL   Salicylate   Result Value Ref Range    Salicylate, Serum 0.5 (L) 15.0 - 30.0 mg/dL   Acetaminophen Level   Result Value Ref Range    Acetaminophen Level <5 (L) 10 - 30 ug/mL   Microscopic Urinalysis   Result Value Ref Range    Mucus, UA 1+ (A) /LPF    WBC, UA None seen 0 - 5 /HPF    RBC, UA 0-2 0 - 2 /HPF    Epi Cells 0-2 /HPF    Bacteria, UA Rare (A) /HPF    Crystals 3+ Uric Acid (A) /HPF      Radiographs (if obtained):    [] Radiologist's Report Reviewed:  No orders to display         EKG (if obtained): (All EKG's are interpreted by myself in the absence of a cardiologist)    Chart review shows recent radiographs:  Xr Lumbar Spine (2-3 Views)    Result Date: 6/13/2019  Lumbar spine HISTORY: Back pain     3 views demonstrate 5 lumbar type vertebral bodies in anatomic alignment without fracture. Mild degenerative endplate changes at N0-1, L3-4, and L4-5. MDM:  78-year-old female with thoughts of ending her life to be out of her pain. Blood sugars noted to be slightly elevated and she is spilling some sugar in the urine this will need further testing as outpatient. This may be a stress reaction however I am concerned about possible diabetes. 0800 medically stable for psychiatric evaluation  0935 patient medically stable for discharge per psychiatry please see their notes and follow-up. I have also spoken to the patient about her blood sugar she will call her primary care provider for recheck and follow-up. She can return at any time if worse or problems. She understands her questions have been answered and she is discharged in stable condition. Clinical Impression:  1. Mood disorder ([address]    2. Hyperglycemia    3.  Depression, unspecified depression type      Disposition referral (if applicable):  Silvano Zuniga MD  [address] [address]  [phone]    Schedule an appointment as soon as possible for a visit in 2 days      [facility] ED  [address] [facility]  [phone]    If symptoms worsen    Disposition medications (if applicable):  New Prescriptions    No medications on file       Comment: Please note this report has been produced using speech recognition software and may contain errors related to that system including errors in grammar, punctuation, and spelling, as well as words and phrases that may be inappropriate. Efforts were made to edit the dictations. Emilio Pereira MD  06/15/19 6744

## 2019-06-15 NOTE — ED NOTES
Level of Care Disposition: Discharge      Patient was seen by ED provider and [facility] AN [facility]IL[facility] OF [facility] staff. The case was presented to psychiatric provider on-call Dr Lauren Anderson. Based on the ED evaluation and information presented to the provider by this nurse the decision was made to discharge patient with the following referrals: William:  The patient does not meet criteria for an involuntary psychiatric admission because is not presenting an imminent risk to self or others and has a plan to follow up with outpatient services. Patient has demonstrated a reasonable safety plan and her boyfriend is picking her up and staying with her.                 Yoshi Paredes RN  06/15/19 5324

## 2019-06-15 NOTE — ED NOTES
Patient brought back from main ED. Patient in safety gown. Patient oriented to [facility] AN AFFILIATE OF [facility]. Will continue to monitor patient.      Gurwinder Castro RN  06/15/19 1394

## 2019-09-08 ENCOUNTER — HOSPITAL ENCOUNTER (EMERGENCY)
Age: 29
Discharge: HOME | End: 2019-09-09
Attending: EMERGENCY MEDICINE

## 2019-09-08 VITALS
HEIGHT: 67 IN | WEIGHT: 188 LBS | OXYGEN SATURATION: 100 % | HEART RATE: 74 BPM | RESPIRATION RATE: 15 BRPM | TEMPERATURE: 98.9 F | SYSTOLIC BLOOD PRESSURE: 141 MMHG | BODY MASS INDEX: 29.51 KG/M2 | DIASTOLIC BLOOD PRESSURE: 93 MMHG

## 2019-09-08 DIAGNOSIS — M25.511 ACUTE PAIN OF RIGHT SHOULDER: Primary | ICD-10-CM

## 2019-09-08 PROCEDURE — 99284 EMERGENCY DEPT VISIT MOD MDM: CPT

## 2019-09-08 ASSESSMENT — PAIN SCALES - GENERAL: PAINLEVEL_OUTOF10: 7

## 2019-09-09 ENCOUNTER — APPOINTMENT (OUTPATIENT)
Dept: GENERAL RADIOLOGY | Age: 29
End: 2019-09-09

## 2019-09-09 PROCEDURE — 73030 X-RAY EXAM OF SHOULDER: CPT

## 2019-09-09 PROCEDURE — 6370000000 HC RX 637 (ALT 250 FOR IP): Performed by: STUDENT IN AN ORGANIZED HEALTH CARE EDUCATION/TRAINING PROGRAM

## 2019-09-09 RX ORDER — IBUPROFEN 400 MG/1
800 TABLET ORAL ONCE
Status: COMPLETED | OUTPATIENT
Start: 2019-09-09 | End: 2019-09-09

## 2019-09-09 RX ADMIN — IBUPROFEN 800 MG: 400 TABLET, FILM COATED ORAL at 00:34

## 2019-09-09 NOTE — ED NOTES
Security called per protocol and now in room to speak with patient.       Tyrell Mary RN  09/08/19 0161
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
## 2019-11-23 ENCOUNTER — HOSPITAL ENCOUNTER (EMERGENCY)
Age: 29
Discharge: HOME | End: 2019-11-23
Attending: EMERGENCY MEDICINE
Payer: MEDICAID

## 2019-11-23 VITALS
BODY MASS INDEX: 29.82 KG/M2 | DIASTOLIC BLOOD PRESSURE: 66 MMHG | OXYGEN SATURATION: 99 % | WEIGHT: 190 LBS | TEMPERATURE: 98.8 F | SYSTOLIC BLOOD PRESSURE: 114 MMHG | RESPIRATION RATE: 12 BRPM | HEART RATE: 82 BPM | HEIGHT: 67 IN

## 2019-11-23 DIAGNOSIS — J02.0 STREPTOCOCCAL SORE THROAT: Primary | ICD-10-CM

## 2019-11-23 DIAGNOSIS — R51.9 NONINTRACTABLE HEADACHE, UNSPECIFIED CHRONICITY PATTERN, UNSPECIFIED HEADACHE TYPE: ICD-10-CM

## 2019-11-23 LAB
CARBOXYHEMOGLOBIN: 1.6 % (ref 0–1.5)
S PYO AG THROAT QL: POSITIVE

## 2019-11-23 PROCEDURE — 87880 STREP A ASSAY W/OPTIC: CPT

## 2019-11-23 PROCEDURE — 36415 COLL VENOUS BLD VENIPUNCTURE: CPT

## 2019-11-23 PROCEDURE — 99284 EMERGENCY DEPT VISIT MOD MDM: CPT

## 2019-11-23 PROCEDURE — 82375 ASSAY CARBOXYHB QUANT: CPT

## 2019-11-23 RX ORDER — AZITHROMYCIN 250 MG/1
TABLET, FILM COATED ORAL
Qty: 6 TABLET | Refills: 0 | Status: SHIPPED | OUTPATIENT
Start: 2019-11-23 | End: 2019-12-03

## 2019-11-23 ASSESSMENT — PAIN DESCRIPTION - LOCATION: LOCATION: THROAT

## 2019-11-23 ASSESSMENT — PAIN DESCRIPTION - PAIN TYPE: TYPE: ACUTE PAIN

## 2019-11-23 ASSESSMENT — PAIN DESCRIPTION - DESCRIPTORS: DESCRIPTORS: SORE

## 2019-11-23 ASSESSMENT — PAIN SCALES - GENERAL: PAINLEVEL_OUTOF10: 7

## 2020-03-03 ENCOUNTER — HOSPITAL ENCOUNTER (EMERGENCY)
Age: 30
Discharge: HOME | End: 2020-03-03
Attending: EMERGENCY MEDICINE
Payer: COMMERCIAL

## 2020-03-03 ENCOUNTER — APPOINTMENT (OUTPATIENT)
Dept: GENERAL RADIOLOGY | Age: 30
End: 2020-03-03
Payer: COMMERCIAL

## 2020-03-03 VITALS
DIASTOLIC BLOOD PRESSURE: 89 MMHG | WEIGHT: 205 LBS | HEART RATE: 76 BPM | RESPIRATION RATE: 18 BRPM | BODY MASS INDEX: 32.18 KG/M2 | TEMPERATURE: 98.3 F | SYSTOLIC BLOOD PRESSURE: 149 MMHG | HEIGHT: 67 IN | OXYGEN SATURATION: 97 %

## 2020-03-03 PROCEDURE — 73130 X-RAY EXAM OF HAND: CPT

## 2020-03-03 PROCEDURE — 99283 EMERGENCY DEPT VISIT LOW MDM: CPT

## 2020-03-03 RX ORDER — ARIPIPRAZOLE 5 MG/1
5 TABLET ORAL NIGHTLY
COMMUNITY

## 2020-03-03 RX ORDER — FLUOXETINE HYDROCHLORIDE 20 MG/1
20 CAPSULE ORAL 2 TIMES DAILY
COMMUNITY

## 2020-03-03 ASSESSMENT — PAIN DESCRIPTION - FREQUENCY: FREQUENCY: INTERMITTENT

## 2020-03-03 ASSESSMENT — PAIN - FUNCTIONAL ASSESSMENT: PAIN_FUNCTIONAL_ASSESSMENT: PREVENTS OR INTERFERES SOME ACTIVE ACTIVITIES AND ADLS

## 2020-03-03 ASSESSMENT — PAIN SCALES - GENERAL: PAINLEVEL_OUTOF10: 3

## 2020-03-03 ASSESSMENT — PAIN DESCRIPTION - ONSET: ONSET: ON-GOING

## 2020-03-03 ASSESSMENT — PAIN DESCRIPTION - PROGRESSION: CLINICAL_PROGRESSION: GRADUALLY WORSENING

## 2020-03-03 ASSESSMENT — PAIN DESCRIPTION - PAIN TYPE: TYPE: ACUTE PAIN

## 2020-03-03 ASSESSMENT — PAIN DESCRIPTION - ORIENTATION: ORIENTATION: RIGHT

## 2020-03-03 ASSESSMENT — PAIN DESCRIPTION - LOCATION: LOCATION: HAND

## 2020-03-03 ASSESSMENT — PAIN DESCRIPTION - DESCRIPTORS: DESCRIPTORS: ACHING;SHARP

## 2020-03-03 NOTE — ED TRIAGE NOTES
Pt is in the emergency department s/p fall with right hand injury (5th digit) on Saturday. The pain is intermittent and worse with movement. Rated 3/10. Sharp. Swelling noted.

## 2020-03-03 NOTE — ED PROVIDER NOTES
[facility]  EMERGENCY DEPARTMENT ENCOUNTER          NURSE PRACTITIONER NOTE     Date of evaluation: 3/3/2020    Chief Complaint     Hand Injury    History of Present Illness     Shelby Astudillo is a 30 y.o. female who presents to the emergency department with right hand injury. She is right-hand dominant. She reports that she tripped over a child at her house on Saturday. She fell onto her right outstretched hand. Her complaint is pain in the distal phalanx of the right fifth digit. She thinks it is broken. She denies any additional injuries from the incident or pain throughout the remainder of the hand. No numbness or weakness. With the exception of the above, there are no aggravating or alleviating factors. Review of Systems     Pertinent positive and negative findings as documented above in HPI, otherwise all other systems were reviewed and negative     Past Medical, Surgical, Family, and Social History     Medical History:   Past Medical History:   Diagnosis Date    ADHD (attention deficit hyperactivity disorder)     Back pain     ALEJANDRO (iron deficiency anemia)     Obesity     Plantar fasciitis of left foot        Surgical History:   Past Surgical History:   Procedure Laterality Date     SECTION      CHOLECYSTECTOMY      LAPAROSCOPY N/A 14    APAROSCOPIC CHOLECYSTECTOMY WITH INTRAOPERATIVE       Social History: She reports that she has been smoking cigarettes. She has been smoking about 0.50 packs per day. She has never used smokeless tobacco. She reports current alcohol use of about 2.0 standard drinks of alcohol per week. She reports that she does not use drugs. Family History: Her family history includes No Known Problems in her brother, father, maternal aunt, maternal grandfather, maternal grandmother, maternal uncle, mother, paternal aunt, paternal grandfather, paternal grandmother, paternal uncle, sister, and another family member.     Medications     Previous Medications    AMPHETAMINE-DEXTROAMPHETAMINE (ADDERALL) 10 MG TABLET    Take 10 mg by mouth daily. .    AMPHETAMINE-DEXTROAMPHETAMINE (ADDERALL) 30 MG TABLET    Take 30 mg by mouth daily. .    ARIPIPRAZOLE (ABILIFY) 5 MG TABLET    Take 5 mg by mouth nightly    BUPROPION (WELLBUTRIN) 100 MG TABLET    Take 250 mg by mouth 2 times daily    FERROUS SULFATE 325 (65 FE) MG TABLET    Take 650 mg by mouth daily (with breakfast)     FLUOXETINE (PROZAC) 20 MG CAPSULE    Take 20 mg by mouth 2 times daily    FUROSEMIDE (LASIX) 20 MG TABLET    Take 20 mg by mouth    IBUPROFEN (ADVIL;MOTRIN) 600 MG TABLET    Take 1 tablet by mouth every 6 hours as needed for Pain    MULTIPLE VITAMINS-MINERALS (THERAPEUTIC MULTIVITAMIN-MINERALS) TABLET    Take 1 tablet by mouth daily    NAPROXEN (NAPROSYN) 500 MG TABLET    Take 1 tablet by mouth 2 times daily for 20 doses       Allergies     Allergies: Allergies as of 03/03/2020 - Review Complete 03/03/2020   Allergen Reaction Noted    Pcn [penicillins] Other (See Comments) and Hives 07/21/2012        Physical Exam     INITIAL VITALS: BP: (!) 149/89, Temp: 98.3 °F (36.8 °C), Pulse: 76, Resp: 18, SpO2: 97 %     General: 30 y.o. female in no apparent distress, well developed, well nourished, non-toxic appearance. HEENT: Atraumatic, normocephalic. EOMs intact. Neck:  Full range of motion. Chest/pulm: Respiratory rate normal. Speaks in complete sentences, no respiratory distress, lungs CTA bilaterally, no wheezes, rales, rhonchi. Cardiovascular: Heart rate normal. RRR, no murmurs, rubs, gallops     Abdomen: No gross distension. Soft, non-tender, non-peritoneal.     : Deferred. Musculoskeletal: No obvious joint deformity. Ambulates without difficulty. There is mild soft tissue swelling over the distal phalanx of the right fifth digit. Unable to range. No significant ecchymosis. Neurovascular intact through the digit.   No tenderness to palpation through the remainder of the phalanges, metacarpals, carpals, distal ulna, radius. No snuffbox tenderness    Neuro: A&O x 4. Normal speech without dysarthria or aphasia. Moves all extremities spontaneously and symmetrically. Gait normal without ataxia. Skin: Warm, dry. No obvious rashes, petechiae, or purpura. Psych: Appropriate mood and affect, normal interaction. Diagnostic Results     RECENT VITALS:  BP: (!) 149/89, Temp: 98.3 °F (36.8 °C), Pulse: 76, Resp: 18, SpO2: 97 %     Procedures     None     ED Course     Nursing Notes, Past Medical Hx, Past Surgical Hx, Social Hx, Allergies, and Family Hx were reviewed. The patient was given the following medications:  No orders of the defined types were placed in this encounter. CONSULTS:  None    MEDICAL DECISION MAKING / ASSESSMENT / PLAN     Briefly, Stacy Thorne is a 30 y.o. female who presented to the emergency department with right hand injury. On presentation, she is well-appearing, no acute distress. She is afebrile with stable vital signs. On exam, she has mild soft tissue swelling about the distal phalanx of the right fifth digit. An x-ray of the hand was obtained that showed fracture of the distal phalanx of the fifth digit with extension to the distal interphalangeal articulation. We will place her in an aluminum finger splint and give her hand follow-up. She was advised on rice therapies as well as Tylenol/ibuprofen for pain. At this point in time, patient is stable for discharge. Patient was given strict return precautions as outlined in the AVS. Patient was agreeable and understanding to this plan of care. Prior to discharge, patient was ambulatory and PO tolerant. The patient was evaluated by myself and the ED Attending Physician, Dr. July Main All management and disposition plans were discussed and agreed upon. Clinical Impression     1.  Closed nondisplaced fracture of distal phalanx of right little finger, initial encounter